# Patient Record
Sex: MALE | Race: WHITE | Employment: FULL TIME | URBAN - NONMETROPOLITAN AREA
[De-identification: names, ages, dates, MRNs, and addresses within clinical notes are randomized per-mention and may not be internally consistent; named-entity substitution may affect disease eponyms.]

---

## 2017-11-15 ENCOUNTER — HOSPITAL ENCOUNTER (EMERGENCY)
Age: 47
Discharge: HOME | End: 2017-11-15

## 2017-11-15 ENCOUNTER — APPOINTMENT (OUTPATIENT)
Dept: CT IMAGING | Age: 47
End: 2017-11-15

## 2017-11-15 VITALS
OXYGEN SATURATION: 96 % | WEIGHT: 170 LBS | TEMPERATURE: 98 F | BODY MASS INDEX: 24.34 KG/M2 | RESPIRATION RATE: 16 BRPM | HEART RATE: 70 BPM | HEIGHT: 70 IN | SYSTOLIC BLOOD PRESSURE: 148 MMHG | DIASTOLIC BLOOD PRESSURE: 96 MMHG

## 2017-11-15 DIAGNOSIS — K40.20 BILATERAL INGUINAL HERNIA WITHOUT OBSTRUCTION OR GANGRENE, RECURRENCE NOT SPECIFIED: ICD-10-CM

## 2017-11-15 DIAGNOSIS — N40.0 ENLARGED PROSTATE: ICD-10-CM

## 2017-11-15 DIAGNOSIS — K52.9 COLITIS: Primary | ICD-10-CM

## 2017-11-15 LAB
ALBUMIN SERPL-MCNC: 4.7 G/DL (ref 3.5–5.2)
ALP BLD-CCNC: 75 U/L (ref 40–130)
ALT SERPL-CCNC: 18 U/L (ref 5–41)
ANION GAP SERPL CALCULATED.3IONS-SCNC: 12 MMOL/L (ref 7–19)
AST SERPL-CCNC: 23 U/L (ref 5–40)
BASOPHILS ABSOLUTE: 0.1 K/UL (ref 0–0.2)
BASOPHILS RELATIVE PERCENT: 0.9 % (ref 0–1)
BILIRUB SERPL-MCNC: 2.2 MG/DL (ref 0.2–1.2)
BILIRUBIN URINE: NEGATIVE
BLOOD, URINE: NEGATIVE
BUN BLDV-MCNC: 7 MG/DL (ref 6–20)
CALCIUM SERPL-MCNC: 9.6 MG/DL (ref 8.6–10)
CHLORIDE BLD-SCNC: 101 MMOL/L (ref 98–111)
CLARITY: CLEAR
CO2: 26 MMOL/L (ref 22–29)
COLOR: YELLOW
CREAT SERPL-MCNC: 0.7 MG/DL (ref 0.5–1.2)
EOSINOPHILS ABSOLUTE: 0.2 K/UL (ref 0–0.6)
EOSINOPHILS RELATIVE PERCENT: 2.6 % (ref 0–5)
GFR NON-AFRICAN AMERICAN: >60
GLUCOSE BLD-MCNC: 104 MG/DL (ref 74–109)
GLUCOSE URINE: NEGATIVE MG/DL
HCT VFR BLD CALC: 55 % (ref 42–52)
HEMOGLOBIN: 19.2 G/DL (ref 14–18)
KETONES, URINE: NEGATIVE MG/DL
LEUKOCYTE ESTERASE, URINE: NEGATIVE
LYMPHOCYTES ABSOLUTE: 2.1 K/UL (ref 1.1–4.5)
LYMPHOCYTES RELATIVE PERCENT: 27.7 % (ref 20–40)
MCH RBC QN AUTO: 32.3 PG (ref 27–31)
MCHC RBC AUTO-ENTMCNC: 34.9 G/DL (ref 33–37)
MCV RBC AUTO: 92.4 FL (ref 80–94)
MONOCYTES ABSOLUTE: 0.6 K/UL (ref 0–0.9)
MONOCYTES RELATIVE PERCENT: 7.9 % (ref 0–10)
NEUTROPHILS ABSOLUTE: 4.6 K/UL (ref 1.5–7.5)
NEUTROPHILS RELATIVE PERCENT: 60.4 % (ref 50–65)
NITRITE, URINE: NEGATIVE
PDW BLD-RTO: 13.4 % (ref 11.5–14.5)
PH UA: 6
PLATELET # BLD: 241 K/UL (ref 130–400)
PMV BLD AUTO: 9.5 FL (ref 9.4–12.4)
POTASSIUM SERPL-SCNC: 3.9 MMOL/L (ref 3.5–5)
PROTEIN UA: NEGATIVE MG/DL
RBC # BLD: 5.95 M/UL (ref 4.7–6.1)
SODIUM BLD-SCNC: 139 MMOL/L (ref 136–145)
SPECIFIC GRAVITY UA: 1
TOTAL PROTEIN: 7.3 G/DL (ref 6.6–8.7)
UROBILINOGEN, URINE: 0.2 E.U./DL
WBC # BLD: 7.7 K/UL (ref 4.8–10.8)

## 2017-11-15 PROCEDURE — 99283 EMERGENCY DEPT VISIT LOW MDM: CPT

## 2017-11-15 PROCEDURE — 36415 COLL VENOUS BLD VENIPUNCTURE: CPT

## 2017-11-15 PROCEDURE — 81003 URINALYSIS AUTO W/O SCOPE: CPT

## 2017-11-15 PROCEDURE — 96374 THER/PROPH/DIAG INJ IV PUSH: CPT

## 2017-11-15 PROCEDURE — 99283 EMERGENCY DEPT VISIT LOW MDM: CPT | Performed by: PHYSICIAN ASSISTANT

## 2017-11-15 PROCEDURE — 96375 TX/PRO/DX INJ NEW DRUG ADDON: CPT

## 2017-11-15 PROCEDURE — 6360000004 HC RX CONTRAST MEDICATION: Performed by: PHYSICIAN ASSISTANT

## 2017-11-15 PROCEDURE — 74177 CT ABD & PELVIS W/CONTRAST: CPT

## 2017-11-15 PROCEDURE — 2580000003 HC RX 258: Performed by: PHYSICIAN ASSISTANT

## 2017-11-15 PROCEDURE — 6360000002 HC RX W HCPCS: Performed by: PHYSICIAN ASSISTANT

## 2017-11-15 PROCEDURE — 80053 COMPREHEN METABOLIC PANEL: CPT

## 2017-11-15 PROCEDURE — 85025 COMPLETE CBC W/AUTO DIFF WBC: CPT

## 2017-11-15 RX ORDER — ONDANSETRON 2 MG/ML
4 INJECTION INTRAMUSCULAR; INTRAVENOUS ONCE
Status: COMPLETED | OUTPATIENT
Start: 2017-11-15 | End: 2017-11-15

## 2017-11-15 RX ORDER — CIPROFLOXACIN 500 MG/1
500 TABLET, FILM COATED ORAL 2 TIMES DAILY
Qty: 20 TABLET | Refills: 0 | Status: SHIPPED | OUTPATIENT
Start: 2017-11-15 | End: 2017-11-25

## 2017-11-15 RX ORDER — IBUPROFEN 200 MG
200 TABLET ORAL EVERY 6 HOURS PRN
COMMUNITY

## 2017-11-15 RX ORDER — METRONIDAZOLE 500 MG/1
500 TABLET ORAL 3 TIMES DAILY
Qty: 30 TABLET | Refills: 0 | Status: SHIPPED | OUTPATIENT
Start: 2017-11-15 | End: 2017-11-25

## 2017-11-15 RX ORDER — ONDANSETRON 4 MG/1
4 TABLET, ORALLY DISINTEGRATING ORAL EVERY 8 HOURS PRN
Qty: 20 TABLET | Refills: 0 | Status: SHIPPED | OUTPATIENT
Start: 2017-11-15 | End: 2020-06-05

## 2017-11-15 RX ORDER — 0.9 % SODIUM CHLORIDE 0.9 %
1000 INTRAVENOUS SOLUTION INTRAVENOUS ONCE
Status: COMPLETED | OUTPATIENT
Start: 2017-11-15 | End: 2017-11-15

## 2017-11-15 RX ORDER — MORPHINE SULFATE 10 MG/ML
2 INJECTION, SOLUTION INTRAMUSCULAR; INTRAVENOUS ONCE
Status: COMPLETED | OUTPATIENT
Start: 2017-11-15 | End: 2017-11-15

## 2017-11-15 RX ADMIN — IOPAMIDOL 90 ML: 755 INJECTION, SOLUTION INTRAVENOUS at 11:23

## 2017-11-15 RX ADMIN — SODIUM CHLORIDE 1000 ML: 9 INJECTION, SOLUTION INTRAVENOUS at 10:36

## 2017-11-15 RX ADMIN — ONDANSETRON 4 MG: 2 INJECTION INTRAMUSCULAR; INTRAVENOUS at 10:36

## 2017-11-15 RX ADMIN — MORPHINE SULFATE 2 MG: 10 INJECTION, SOLUTION INTRAMUSCULAR; INTRAVENOUS at 10:36

## 2017-11-15 ASSESSMENT — PAIN DESCRIPTION - ORIENTATION: ORIENTATION: LEFT

## 2017-11-15 ASSESSMENT — PAIN DESCRIPTION - PAIN TYPE: TYPE: ACUTE PAIN

## 2017-11-15 ASSESSMENT — PAIN DESCRIPTION - LOCATION: LOCATION: GROIN

## 2017-11-15 ASSESSMENT — PAIN SCALES - GENERAL
PAINLEVEL_OUTOF10: 8
PAINLEVEL_OUTOF10: 8

## 2017-11-15 NOTE — ED PROVIDER NOTES
eMERGENCY dEPARTMENT eNCOUnter      Pt Name: Cha Howard  MRN: [id number]  [place] 1970  Date of evaluation: 11/15/2017  Provider: Malathi Curran, [address] [place] [address]       Chief Complaint   Patient presents with    Groin Pain     left         HISTORY OF PRESENT ILLNESS  (Location/Symptom, Timing/Onset, Context/Setting, Quality, Duration, Modifying Factors, Severity.)   Cha Howard is a 56 y.o. male who presents to the emergency department With left groin pain. Patient states he's had this pain off and on for the past 2-3 weeks. He's noticed a bulge near his left groin region. States the bulge is worse when he is lifting things. Denies any fevers at home no nausea vomiting. He has not had a bowel movement in a couple of days however prior to that he had some diarrhea. Denies bloody stool. States the pain got increasingly worse yesterday. Denies any hematuria no difficulty urinating. Pain does not radiate into the testicles. Nursing Notes were reviewed and I agree. REVIEW OF SYSTEMS    (2-9 systems for level 4, 10 or more for level 5)     Review of Systems   Genitourinary:        Groin pain        Except as noted above the remainder of the review of systems was reviewed and negative. PAST MEDICAL HISTORY   History reviewed. No pertinent past medical history. SURGICAL HISTORY     History reviewed. No pertinent surgical history. CURRENT MEDICATIONS       Discharge Medication List as of 11/15/2017 12:11 PM      CONTINUE these medications which have NOT CHANGED    Details   ibuprofen (ADVIL;MOTRIN) 200 MG tablet Take 200 mg by mouth every 6 hours as needed for PainHistorical Med             ALLERGIES     Review of patient's allergies indicates no known allergies. FAMILY HISTORY     History reviewed. No pertinent family history.        SOCIAL HISTORY       Social History     Social History    Marital status:      Spouse name: N/A    Number of children: N/A  Years of education: N/A     Social History Main Topics    Smoking status: Current Every Day Smoker     Packs/day: 1.00    Smokeless tobacco: Never Used    Alcohol use 25.2 oz/week     42 Cans of beer per week    Drug use: No    Sexual activity: Not Asked     Other Topics Concern    None     Social History Narrative    None       SCREENINGS           PHYSICAL EXAM    (up to 7 for level 4, 8 or more for level 5)     ED Triage Vitals   BP Temp Temp src Pulse Resp SpO2 Height Weight   11/15/17 1013 11/15/17 1013 -- 11/15/17 1012 11/15/17 1012 11/15/17 1012 11/15/17 1012 11/15/17 1012   (!) 155/103 98 °F (36.7 °C)  73 18 94 % 5' 10\" (1.778 m) 170 lb (77.1 kg)       Physical Exam   Constitutional: He is oriented to person, place, and time. He appears well-developed and well-nourished. No distress. HENT:   Head: Normocephalic and atraumatic. Neck: Normal range of motion. Cardiovascular: Normal rate, regular rhythm and normal heart sounds. Exam reveals no gallop and no friction rub. No murmur heard. Pulmonary/Chest: Effort normal and breath sounds normal. No respiratory distress. He has no wheezes. He has no rales. He exhibits no tenderness. Abdominal: Soft. Bowel sounds are normal. He exhibits no distension. There is tenderness. There is no rebound and no guarding. A hernia is present. Hernia confirmed positive in the left inguinal area. Hernia confirmed negative in the right inguinal area. Genitourinary: Testes normal.   Lymphadenopathy:     He has no cervical adenopathy. Right: No inguinal adenopathy present. Left: No inguinal adenopathy present. Neurological: He is alert and oriented to person, place, and time. Skin: Skin is warm and dry. He is not diaphoretic. Psychiatric: He has a normal mood and affect. Nursing note and vitals reviewed.         DIAGNOSTIC RESULTS     RADIOLOGY:   Non-plain film images such as CT, Ultrasound and MRI are read by the radiologist.   Interpretation per the Radiologist below, if available at the time of this note:    CT ABDOMEN PELVIS W IV CONTRAST Additional Contrast? None   Final Result   Inflammatory changes of the left colon/acute colitis. Markedly distended urinary bladder which reaches up to the umbilicus. The etiology is not certain. Moderately enlarged prostate with intrinsic calcification. A mild right hydronephrosis. No radiopaque calculi. This may be   secondary to the distended urinary bladder? .   Small fat-containing inguinal hernias bilaterally. The above findings are recorded on a digital voice clip in PACS. Signed by Dr Samantha Hoover on 11/15/2017 11:48 AM          LABS:  Labs Reviewed   CBC WITH AUTO DIFFERENTIAL - Abnormal; Notable for the following:        Result Value    Hemoglobin 19.2 (*)     Hematocrit 55.0 (*)     MCH 32.3 (*)     All other components within normal limits   COMPREHENSIVE METABOLIC PANEL - Abnormal; Notable for the following: Total Bilirubin 2.2 (*)     All other components within normal limits   URINALYSIS       All other labs were within normal range or not returned as of this dictation. EMERGENCY DEPARTMENT COURSE and DIFFERENTIAL DIAGNOSIS/MDM:   Vitals:    Vitals:    11/15/17 1012 11/15/17 1013 11/15/17 1235   BP:  (!) 155/103 (!) 148/96   Pulse: 73  70   Resp: 18  16   Temp:  98 °F (36.7 °C) 98 °F (36.7 °C)   TempSrc:   Oral   SpO2: 94%  96%   Weight: 170 lb (77.1 kg)     Height: 5' 10\" (1.778 m)             MDM  Number of Diagnoses or Management Options  Bilateral inguinal hernia without obstruction or gangrene, recurrence not specified:   Colitis:   Enlarged prostate:   Diagnosis management comments: Discussed CT findings with the patient. Showed left acute colitis. Also noted moderately enlarged prostate with calcification. Patient had an enlarged bladder noted on CT the abdomen however he urinated over 800 mL of urine after his scan without difficulty.   We'll (Please note that portions of this note were completed with a voice recognition program.  Efforts were made to edit the dictations but occasionally words are mis-transcribed.)    SRIRAM Waldron [place]  11/15/17 0979

## 2020-05-22 ENCOUNTER — PREP FOR PROCEDURE (OUTPATIENT)
Dept: SURGERY | Age: 50
End: 2020-05-22

## 2020-05-22 ENCOUNTER — OFFICE VISIT (OUTPATIENT)
Dept: SURGERY | Age: 50
End: 2020-05-22
Payer: MEDICAID

## 2020-05-22 VITALS
DIASTOLIC BLOOD PRESSURE: 103 MMHG | HEIGHT: 69 IN | SYSTOLIC BLOOD PRESSURE: 147 MMHG | HEART RATE: 100 BPM | BODY MASS INDEX: 25.62 KG/M2 | TEMPERATURE: 97.2 F | WEIGHT: 173 LBS

## 2020-05-22 PROBLEM — K40.20 BILATERAL SCROTAL HERNIA: Status: ACTIVE | Noted: 2020-05-22

## 2020-05-22 PROCEDURE — 99203 OFFICE O/P NEW LOW 30 MIN: CPT | Performed by: PHYSICIAN ASSISTANT

## 2020-05-22 RX ORDER — SODIUM CHLORIDE 0.9 % (FLUSH) 0.9 %
10 SYRINGE (ML) INJECTION PRN
Status: CANCELLED | OUTPATIENT
Start: 2020-05-22

## 2020-05-22 RX ORDER — SODIUM CHLORIDE 0.9 % (FLUSH) 0.9 %
10 SYRINGE (ML) INJECTION EVERY 12 HOURS SCHEDULED
Status: CANCELLED | OUTPATIENT
Start: 2020-05-22

## 2020-05-22 RX ORDER — SODIUM CHLORIDE, SODIUM LACTATE, POTASSIUM CHLORIDE, CALCIUM CHLORIDE 600; 310; 30; 20 MG/100ML; MG/100ML; MG/100ML; MG/100ML
INJECTION, SOLUTION INTRAVENOUS CONTINUOUS
Status: CANCELLED | OUTPATIENT
Start: 2020-05-22

## 2020-05-22 ASSESSMENT — ENCOUNTER SYMPTOMS
DIARRHEA: 1
ABDOMINAL DISTENTION: 0
WHEEZING: 0
BACK PAIN: 1
APNEA: 0
NAUSEA: 0
SINUS PRESSURE: 0
ABDOMINAL PAIN: 1
BLOOD IN STOOL: 1
VOMITING: 0
SHORTNESS OF BREATH: 0
ALLERGIC/IMMUNOLOGIC NEGATIVE: 1
CONSTIPATION: 0
EYE DISCHARGE: 1

## 2020-05-22 NOTE — H&P
risks, benefits, and options were discussed with the pt. He is willing to accept all risks and proceed with a Lap. Bilateral Inguinal hernia repair with mesh with an open repair. The surgical risks included but not limited to bleeding, infection, perforation, risk of nerve injury, risk of recurrence, risk of chronic pain, etc. The anesthetic risks included heart attacks, strokes, pneumonia, phlebitis, etc.  He is willing to accept all risks and proceed with surgery. No guarantees have been given.       Electronically signed by Charels Moreno PA-C on 5/22/20 at 4:13 PM CDT

## 2020-05-22 NOTE — H&P (VIEW-ONLY)
of Systems   Constitutional: Positive for fatigue. Negative for appetite change, chills, fever and unexpected weight change. HENT: Negative for dental problem, hearing loss, sinus pressure and tinnitus. Eyes: Positive for discharge. Respiratory: Negative for apnea, shortness of breath and wheezing. Cardiovascular: Positive for palpitations. Negative for chest pain and leg swelling. Gastrointestinal: Positive for abdominal pain, blood in stool and diarrhea. Negative for abdominal distention, constipation, nausea and vomiting. Endocrine: Negative for polydipsia, polyphagia and polyuria. Genitourinary: Negative for difficulty urinating, dysuria, flank pain and hematuria. Musculoskeletal: Positive for back pain. Negative for arthralgias, joint swelling and myalgias. Skin: Negative. Allergic/Immunologic: Negative. Neurological: Positive for headaches. Negative for dizziness, seizures and syncope. Hematological: Negative for adenopathy. Does not bruise/bleed easily. Psychiatric/Behavioral: Positive for sleep disturbance. Negative for confusion. The patient is nervous/anxious. OBJECTIVE:  BP (!) 147/103 (Site: Right Upper Arm, Position: Sitting, Cuff Size: Medium Adult)   Pulse 100   Temp 97.2 °F (36.2 °C) (Temporal)   Ht 5' 9\" (1.753 m)   Wt 173 lb (78.5 kg)   BMI 25.55 kg/m²   Physical Exam  Vitals signs reviewed. Constitutional:       Appearance: Normal appearance. He is not diaphoretic. HENT:      Head: Normocephalic and atraumatic. Nose: Nose normal. No congestion or rhinorrhea. Mouth/Throat:      Mouth: Mucous membranes are moist.      Pharynx: Oropharynx is clear. No oropharyngeal exudate or posterior oropharyngeal erythema. Eyes:      General: No scleral icterus. Extraocular Movements: Extraocular movements intact. Conjunctiva/sclera: Conjunctivae normal.      Pupils: Pupils are equal, round, and reactive to light.    Neck:      Musculoskeletal: risks, benefits, and options were discussed with the pt. He is willing to accept all risks and proceed with a Lap. Bilateral Inguinal hernia repair with mesh with an open repair. The surgical risks included but not limited to bleeding, infection, perforation, risk of nerve injury, risk of recurrence, risk of chronic pain, etc. The anesthetic risks included heart attacks, strokes, pneumonia, phlebitis, etc.  He is willing to accept all risks and proceed with surgery. No guarantees have been given.       Electronically signed by Carlos Scruggs PA-C on 5/22/20 at 4:13 PM CDT

## 2020-05-22 NOTE — LETTER
SCHEDULING INSTRUCTIONS:     Patient: Doug Soto  : 1970    Hospital: [facility]    Admitting Physician:  Dr. Goyo Cueva    Diagnosis: Bilateral scrotal hernias    Procedure: Lap. Bilateral Inguinal hernia repair with mesh - possible open    Time: 1 1/2 hrs    ALLOW ADDITIONAL 30 MINS FOR TURNOVER EXCEPT FOR PHYSICIAN'S BOUNCE DAY    Anesthesia: GEN    Admission:Outpatient     Date: for NA    Preop medications for Dr. Sung Howe patients:  Celebrex 200 mg, Tylenol 975 mg, Gabapentin 300 mg take with a sip of water in holding.              NOT TO BE USED OUTSIDE OF THE OFFICE

## 2020-06-05 ENCOUNTER — HOSPITAL ENCOUNTER (OUTPATIENT)
Dept: PREADMISSION TESTING | Age: 50
Discharge: HOME | End: 2020-06-09
Payer: MEDICAID

## 2020-06-05 VITALS — BODY MASS INDEX: 25.62 KG/M2 | WEIGHT: 173 LBS | HEIGHT: 69 IN

## 2020-06-05 PROCEDURE — 93005 ELECTROCARDIOGRAM TRACING: CPT | Performed by: ANESTHESIOLOGY

## 2020-06-05 PROCEDURE — 87081 CULTURE SCREEN ONLY: CPT

## 2020-06-06 ENCOUNTER — OFFICE VISIT (OUTPATIENT)
Age: 50
End: 2020-06-06

## 2020-06-06 VITALS — OXYGEN SATURATION: 96 % | TEMPERATURE: 97.1 F

## 2020-06-06 LAB
EKG P AXIS: 63 DEGREES
EKG P-R INTERVAL: 142 MS
EKG Q-T INTERVAL: 394 MS
EKG QRS DURATION: 102 MS
EKG QTC CALCULATION (BAZETT): 397 MS
EKG T AXIS: 45 DEGREES
MRSA CULTURE ONLY: NORMAL

## 2020-06-06 PROCEDURE — 93010 ELECTROCARDIOGRAM REPORT: CPT | Performed by: INTERNAL MEDICINE

## 2020-06-06 NOTE — PROGRESS NOTES
Patient was not seen//assessed by provider in the flu clinic today. COVID swab was order in compliance with requirement for testing prior to surgery or invasive procedure. Nasopharyngeal swab was collected and ordered through [facility] vendor.

## 2020-06-09 ENCOUNTER — ANESTHESIA EVENT (OUTPATIENT)
Dept: OPERATING ROOM | Age: 50
End: 2020-06-09
Payer: MEDICAID

## 2020-06-09 LAB
REPORT: NORMAL
SARS-COV-2: NOT DETECTED
THIS TEST SENT TO: NORMAL

## 2020-06-10 ENCOUNTER — ANESTHESIA (OUTPATIENT)
Dept: OPERATING ROOM | Age: 50
End: 2020-06-10
Payer: MEDICAID

## 2020-06-10 ENCOUNTER — HOSPITAL ENCOUNTER (OUTPATIENT)
Age: 50
Setting detail: OUTPATIENT SURGERY
Discharge: HOME | End: 2020-06-10
Attending: SURGERY | Admitting: SURGERY
Payer: MEDICAID

## 2020-06-10 VITALS
SYSTOLIC BLOOD PRESSURE: 146 MMHG | RESPIRATION RATE: 3 BRPM | OXYGEN SATURATION: 98 % | DIASTOLIC BLOOD PRESSURE: 82 MMHG | TEMPERATURE: 96.4 F

## 2020-06-10 VITALS
OXYGEN SATURATION: 96 % | RESPIRATION RATE: 18 BRPM | BODY MASS INDEX: 25.62 KG/M2 | TEMPERATURE: 97.8 F | DIASTOLIC BLOOD PRESSURE: 93 MMHG | WEIGHT: 173 LBS | HEART RATE: 59 BPM | SYSTOLIC BLOOD PRESSURE: 139 MMHG | HEIGHT: 69 IN

## 2020-06-10 PROBLEM — K40.20 NON-RECURRENT BILATERAL INGUINAL HERNIA WITHOUT OBSTRUCTION OR GANGRENE: Status: ACTIVE | Noted: 2020-06-10

## 2020-06-10 PROCEDURE — 2500000003 HC RX 250 WO HCPCS: Performed by: SURGERY

## 2020-06-10 PROCEDURE — 3600000004 HC SURGERY LEVEL 4 BASE: Performed by: SURGERY

## 2020-06-10 PROCEDURE — C1781 MESH (IMPLANTABLE): HCPCS | Performed by: SURGERY

## 2020-06-10 PROCEDURE — 6360000002 HC RX W HCPCS: Performed by: PHYSICIAN ASSISTANT

## 2020-06-10 PROCEDURE — 3600000014 HC SURGERY LEVEL 4 ADDTL 15MIN: Performed by: SURGERY

## 2020-06-10 PROCEDURE — 2580000003 HC RX 258: Performed by: PHYSICIAN ASSISTANT

## 2020-06-10 PROCEDURE — 6360000002 HC RX W HCPCS: Performed by: NURSE ANESTHETIST, CERTIFIED REGISTERED

## 2020-06-10 PROCEDURE — 7100000001 HC PACU RECOVERY - ADDTL 15 MIN: Performed by: SURGERY

## 2020-06-10 PROCEDURE — 3700000001 HC ADD 15 MINUTES (ANESTHESIA): Performed by: SURGERY

## 2020-06-10 PROCEDURE — 2500000003 HC RX 250 WO HCPCS: Performed by: NURSE ANESTHETIST, CERTIFIED REGISTERED

## 2020-06-10 PROCEDURE — C1727 CATH, BAL TIS DIS, NON-VAS: HCPCS | Performed by: SURGERY

## 2020-06-10 PROCEDURE — 2709999900 HC NON-CHARGEABLE SUPPLY: Performed by: SURGERY

## 2020-06-10 PROCEDURE — 7100000010 HC PHASE II RECOVERY - FIRST 15 MIN: Performed by: SURGERY

## 2020-06-10 PROCEDURE — 2720000010 HC SURG SUPPLY STERILE: Performed by: SURGERY

## 2020-06-10 PROCEDURE — 3700000000 HC ANESTHESIA ATTENDED CARE: Performed by: SURGERY

## 2020-06-10 PROCEDURE — 7100000011 HC PHASE II RECOVERY - ADDTL 15 MIN: Performed by: SURGERY

## 2020-06-10 PROCEDURE — 49650 LAP ING HERNIA REPAIR INIT: CPT | Performed by: SURGERY

## 2020-06-10 PROCEDURE — 7100000000 HC PACU RECOVERY - FIRST 15 MIN: Performed by: SURGERY

## 2020-06-10 DEVICE — MESH HERN 15X15CM MACROPOROUS ULTRAPRO ADV: Type: IMPLANTABLE DEVICE | Status: FUNCTIONAL

## 2020-06-10 RX ORDER — LABETALOL HYDROCHLORIDE 5 MG/ML
5 INJECTION, SOLUTION INTRAVENOUS EVERY 10 MIN PRN
Status: DISCONTINUED | OUTPATIENT
Start: 2020-06-10 | End: 2020-06-10 | Stop reason: HOSPADM

## 2020-06-10 RX ORDER — BUPIVACAINE HYDROCHLORIDE 2.5 MG/ML
INJECTION, SOLUTION INFILTRATION; PERINEURAL PRN
Status: DISCONTINUED | OUTPATIENT
Start: 2020-06-10 | End: 2020-06-10 | Stop reason: ALTCHOICE

## 2020-06-10 RX ORDER — SODIUM CHLORIDE, SODIUM LACTATE, POTASSIUM CHLORIDE, CALCIUM CHLORIDE 600; 310; 30; 20 MG/100ML; MG/100ML; MG/100ML; MG/100ML
INJECTION, SOLUTION INTRAVENOUS CONTINUOUS
Status: DISCONTINUED | OUTPATIENT
Start: 2020-06-10 | End: 2020-06-10 | Stop reason: HOSPADM

## 2020-06-10 RX ORDER — METOCLOPRAMIDE HYDROCHLORIDE 5 MG/ML
10 INJECTION INTRAMUSCULAR; INTRAVENOUS
Status: DISCONTINUED | OUTPATIENT
Start: 2020-06-10 | End: 2020-06-10 | Stop reason: HOSPADM

## 2020-06-10 RX ORDER — MORPHINE SULFATE 4 MG/ML
4 INJECTION, SOLUTION INTRAMUSCULAR; INTRAVENOUS EVERY 5 MIN PRN
Status: DISCONTINUED | OUTPATIENT
Start: 2020-06-10 | End: 2020-06-10 | Stop reason: HOSPADM

## 2020-06-10 RX ORDER — HYDROMORPHONE HYDROCHLORIDE 1 MG/ML
0.25 INJECTION, SOLUTION INTRAMUSCULAR; INTRAVENOUS; SUBCUTANEOUS EVERY 5 MIN PRN
Status: DISCONTINUED | OUTPATIENT
Start: 2020-06-10 | End: 2020-06-10 | Stop reason: HOSPADM

## 2020-06-10 RX ORDER — MIDAZOLAM HYDROCHLORIDE 1 MG/ML
2 INJECTION INTRAMUSCULAR; INTRAVENOUS
Status: DISCONTINUED | OUTPATIENT
Start: 2020-06-10 | End: 2020-06-10 | Stop reason: HOSPADM

## 2020-06-10 RX ORDER — SODIUM CHLORIDE 0.9 % (FLUSH) 0.9 %
10 SYRINGE (ML) INJECTION PRN
Status: DISCONTINUED | OUTPATIENT
Start: 2020-06-10 | End: 2020-06-10 | Stop reason: HOSPADM

## 2020-06-10 RX ORDER — HYDROCODONE BITARTRATE AND ACETAMINOPHEN 5; 325 MG/1; MG/1
2 TABLET ORAL PRN
Status: DISCONTINUED | OUTPATIENT
Start: 2020-06-10 | End: 2020-06-10 | Stop reason: HOSPADM

## 2020-06-10 RX ORDER — SODIUM CHLORIDE 9 MG/ML
INJECTION, SOLUTION INTRAVENOUS CONTINUOUS
Status: DISCONTINUED | OUTPATIENT
Start: 2020-06-10 | End: 2020-06-10 | Stop reason: HOSPADM

## 2020-06-10 RX ORDER — OXYCODONE HYDROCHLORIDE AND ACETAMINOPHEN 5; 325 MG/1; MG/1
1 TABLET ORAL EVERY 4 HOURS PRN
Qty: 25 TABLET | Refills: 0 | Status: SHIPPED | OUTPATIENT
Start: 2020-06-10 | End: 2020-06-17

## 2020-06-10 RX ORDER — GLYCOPYRROLATE 0.2 MG/ML
INJECTION INTRAMUSCULAR; INTRAVENOUS PRN
Status: DISCONTINUED | OUTPATIENT
Start: 2020-06-10 | End: 2020-06-10 | Stop reason: SDUPTHER

## 2020-06-10 RX ORDER — HYDROMORPHONE HYDROCHLORIDE 1 MG/ML
0.5 INJECTION, SOLUTION INTRAMUSCULAR; INTRAVENOUS; SUBCUTANEOUS EVERY 5 MIN PRN
Status: DISCONTINUED | OUTPATIENT
Start: 2020-06-10 | End: 2020-06-10 | Stop reason: HOSPADM

## 2020-06-10 RX ORDER — LIDOCAINE HYDROCHLORIDE 10 MG/ML
INJECTION, SOLUTION EPIDURAL; INFILTRATION; INTRACAUDAL; PERINEURAL PRN
Status: DISCONTINUED | OUTPATIENT
Start: 2020-06-10 | End: 2020-06-10 | Stop reason: SDUPTHER

## 2020-06-10 RX ORDER — DEXAMETHASONE SODIUM PHOSPHATE 10 MG/ML
INJECTION, SOLUTION INTRAMUSCULAR; INTRAVENOUS PRN
Status: DISCONTINUED | OUTPATIENT
Start: 2020-06-10 | End: 2020-06-10 | Stop reason: SDUPTHER

## 2020-06-10 RX ORDER — PROPOFOL 10 MG/ML
INJECTION, EMULSION INTRAVENOUS PRN
Status: DISCONTINUED | OUTPATIENT
Start: 2020-06-10 | End: 2020-06-10 | Stop reason: SDUPTHER

## 2020-06-10 RX ORDER — HYDROCODONE BITARTRATE AND ACETAMINOPHEN 5; 325 MG/1; MG/1
1 TABLET ORAL PRN
Status: DISCONTINUED | OUTPATIENT
Start: 2020-06-10 | End: 2020-06-10 | Stop reason: HOSPADM

## 2020-06-10 RX ORDER — FENTANYL CITRATE 50 UG/ML
25 INJECTION, SOLUTION INTRAMUSCULAR; INTRAVENOUS
Status: DISCONTINUED | OUTPATIENT
Start: 2020-06-10 | End: 2020-06-10 | Stop reason: HOSPADM

## 2020-06-10 RX ORDER — LIDOCAINE HYDROCHLORIDE 10 MG/ML
1 INJECTION, SOLUTION EPIDURAL; INFILTRATION; INTRACAUDAL; PERINEURAL
Status: DISCONTINUED | OUTPATIENT
Start: 2020-06-10 | End: 2020-06-10 | Stop reason: HOSPADM

## 2020-06-10 RX ORDER — DIPHENHYDRAMINE HYDROCHLORIDE 50 MG/ML
12.5 INJECTION INTRAMUSCULAR; INTRAVENOUS
Status: DISCONTINUED | OUTPATIENT
Start: 2020-06-10 | End: 2020-06-10 | Stop reason: HOSPADM

## 2020-06-10 RX ORDER — MORPHINE SULFATE 4 MG/ML
2 INJECTION, SOLUTION INTRAMUSCULAR; INTRAVENOUS EVERY 5 MIN PRN
Status: DISCONTINUED | OUTPATIENT
Start: 2020-06-10 | End: 2020-06-10 | Stop reason: HOSPADM

## 2020-06-10 RX ORDER — PROMETHAZINE HYDROCHLORIDE 25 MG/ML
6.25 INJECTION, SOLUTION INTRAMUSCULAR; INTRAVENOUS
Status: DISCONTINUED | OUTPATIENT
Start: 2020-06-10 | End: 2020-06-10 | Stop reason: HOSPADM

## 2020-06-10 RX ORDER — SODIUM CHLORIDE 0.9 % (FLUSH) 0.9 %
10 SYRINGE (ML) INJECTION EVERY 12 HOURS SCHEDULED
Status: DISCONTINUED | OUTPATIENT
Start: 2020-06-10 | End: 2020-06-10 | Stop reason: HOSPADM

## 2020-06-10 RX ORDER — KETOROLAC TROMETHAMINE 30 MG/ML
INJECTION, SOLUTION INTRAMUSCULAR; INTRAVENOUS PRN
Status: DISCONTINUED | OUTPATIENT
Start: 2020-06-10 | End: 2020-06-10 | Stop reason: SDUPTHER

## 2020-06-10 RX ORDER — MEPERIDINE HYDROCHLORIDE 50 MG/ML
12.5 INJECTION INTRAMUSCULAR; INTRAVENOUS; SUBCUTANEOUS EVERY 5 MIN PRN
Status: DISCONTINUED | OUTPATIENT
Start: 2020-06-10 | End: 2020-06-10 | Stop reason: HOSPADM

## 2020-06-10 RX ORDER — HYDRALAZINE HYDROCHLORIDE 20 MG/ML
5 INJECTION INTRAMUSCULAR; INTRAVENOUS EVERY 10 MIN PRN
Status: DISCONTINUED | OUTPATIENT
Start: 2020-06-10 | End: 2020-06-10 | Stop reason: HOSPADM

## 2020-06-10 RX ORDER — ENALAPRILAT 2.5 MG/2ML
1.25 INJECTION INTRAVENOUS
Status: DISCONTINUED | OUTPATIENT
Start: 2020-06-10 | End: 2020-06-10 | Stop reason: HOSPADM

## 2020-06-10 RX ORDER — FENTANYL CITRATE 50 UG/ML
50 INJECTION, SOLUTION INTRAMUSCULAR; INTRAVENOUS
Status: DISCONTINUED | OUTPATIENT
Start: 2020-06-10 | End: 2020-06-10 | Stop reason: HOSPADM

## 2020-06-10 RX ORDER — FENTANYL CITRATE 50 UG/ML
INJECTION, SOLUTION INTRAMUSCULAR; INTRAVENOUS PRN
Status: DISCONTINUED | OUTPATIENT
Start: 2020-06-10 | End: 2020-06-10 | Stop reason: SDUPTHER

## 2020-06-10 RX ORDER — ROCURONIUM BROMIDE 10 MG/ML
INJECTION, SOLUTION INTRAVENOUS PRN
Status: DISCONTINUED | OUTPATIENT
Start: 2020-06-10 | End: 2020-06-10 | Stop reason: SDUPTHER

## 2020-06-10 RX ORDER — ONDANSETRON 2 MG/ML
INJECTION INTRAMUSCULAR; INTRAVENOUS PRN
Status: DISCONTINUED | OUTPATIENT
Start: 2020-06-10 | End: 2020-06-10 | Stop reason: SDUPTHER

## 2020-06-10 RX ADMIN — LIDOCAINE HYDROCHLORIDE 50 MG: 10 INJECTION, SOLUTION EPIDURAL; INFILTRATION; INTRACAUDAL; PERINEURAL at 13:01

## 2020-06-10 RX ADMIN — SODIUM CHLORIDE, SODIUM LACTATE, POTASSIUM CHLORIDE, AND CALCIUM CHLORIDE: 600; 310; 30; 20 INJECTION, SOLUTION INTRAVENOUS at 11:39

## 2020-06-10 RX ADMIN — NEOSTIGMINE METHYLSULFATE 1 MG: 1 INJECTION, SOLUTION INTRAMUSCULAR; INTRAVENOUS; SUBCUTANEOUS at 14:44

## 2020-06-10 RX ADMIN — HYDROMORPHONE HYDROCHLORIDE 0.5 MG: 1 INJECTION, SOLUTION INTRAMUSCULAR; INTRAVENOUS; SUBCUTANEOUS at 15:00

## 2020-06-10 RX ADMIN — KETOROLAC TROMETHAMINE 30 MG: 30 INJECTION, SOLUTION INTRAMUSCULAR; INTRAVENOUS at 13:18

## 2020-06-10 RX ADMIN — DEXAMETHASONE SODIUM PHOSPHATE 10 MG: 10 INJECTION, SOLUTION INTRAMUSCULAR; INTRAVENOUS at 13:11

## 2020-06-10 RX ADMIN — HYDROMORPHONE HYDROCHLORIDE 0.5 MG: 1 INJECTION, SOLUTION INTRAMUSCULAR; INTRAVENOUS; SUBCUTANEOUS at 15:39

## 2020-06-10 RX ADMIN — PROPOFOL 200 MG: 10 INJECTION, EMULSION INTRAVENOUS at 13:01

## 2020-06-10 RX ADMIN — FENTANYL CITRATE 50 MCG: 50 INJECTION INTRAMUSCULAR; INTRAVENOUS at 13:33

## 2020-06-10 RX ADMIN — HYDROMORPHONE HYDROCHLORIDE 0.5 MG: 1 INJECTION, SOLUTION INTRAMUSCULAR; INTRAVENOUS; SUBCUTANEOUS at 14:38

## 2020-06-10 RX ADMIN — ROCURONIUM BROMIDE 75 MG: 10 INJECTION, SOLUTION INTRAVENOUS at 13:01

## 2020-06-10 RX ADMIN — ONDANSETRON HYDROCHLORIDE 4 MG: 2 INJECTION, SOLUTION INTRAMUSCULAR; INTRAVENOUS at 14:38

## 2020-06-10 RX ADMIN — FENTANYL CITRATE 100 MCG: 50 INJECTION INTRAMUSCULAR; INTRAVENOUS at 13:43

## 2020-06-10 RX ADMIN — GLYCOPYRROLATE 0.2 MG: 0.2 INJECTION, SOLUTION INTRAMUSCULAR; INTRAVENOUS at 14:44

## 2020-06-10 RX ADMIN — Medication 2 G: at 13:03

## 2020-06-10 RX ADMIN — SODIUM CHLORIDE, SODIUM LACTATE, POTASSIUM CHLORIDE, AND CALCIUM CHLORIDE: 600; 310; 30; 20 INJECTION, SOLUTION INTRAVENOUS at 14:27

## 2020-06-10 RX ADMIN — FENTANYL CITRATE 100 MCG: 50 INJECTION INTRAMUSCULAR; INTRAVENOUS at 13:01

## 2020-06-10 RX ADMIN — HYDROMORPHONE HYDROCHLORIDE 1 MG: 1 INJECTION, SOLUTION INTRAMUSCULAR; INTRAVENOUS; SUBCUTANEOUS at 15:09

## 2020-06-10 RX ADMIN — LIDOCAINE HYDROCHLORIDE 50 MG: 10 INJECTION, SOLUTION EPIDURAL; INFILTRATION; INTRACAUDAL; PERINEURAL at 15:00

## 2020-06-10 ASSESSMENT — PAIN SCALES - GENERAL
PAINLEVEL_OUTOF10: 4
PAINLEVEL_OUTOF10: 7
PAINLEVEL_OUTOF10: 4
PAINLEVEL_OUTOF10: 7
PAINLEVEL_OUTOF10: 5
PAINLEVEL_OUTOF10: 4

## 2020-06-10 ASSESSMENT — PAIN DESCRIPTION - LOCATION
LOCATION: ABDOMEN
LOCATION: ABDOMEN

## 2020-06-10 ASSESSMENT — LIFESTYLE VARIABLES: SMOKING_STATUS: 1

## 2020-06-10 ASSESSMENT — PAIN DESCRIPTION - PAIN TYPE: TYPE: SURGICAL PAIN

## 2020-06-10 ASSESSMENT — ENCOUNTER SYMPTOMS: SHORTNESS OF BREATH: 0

## 2020-06-10 NOTE — ANESTHESIA PRE PROCEDURE
Department of Anesthesiology  Preprocedure Note       Name:  Francisca Yan   Age:  53 y.o.  :  1970                                          MRN:  [id number]         Date:  6/10/2020      Surgeon: Panda Prasad):  Evelina Hastings MD    Procedure: Procedure(s):  LAPAROSCOPIC BILATERAL INGUINAL HERNIA REPAIR WITH MESH- POSSIBLE OPEN    Medications prior to admission:   Prior to Admission medications    Medication Sig Start Date End Date Taking? Authorizing Provider   ibuprofen (ADVIL;MOTRIN) 200 MG tablet Take 200 mg by mouth every 6 hours as needed for Pain   Yes Historical Provider, MD       Current medications:    Current Facility-Administered Medications   Medication Dose Route Frequency Provider Last Rate Last Dose    ceFAZolin (ANCEF) 2 g in 0.9% sodium chloride 50 mL IVPB  2 g Intravenous On Call to [address] [place] [address]        lactated ringers infusion   Intravenous Continuous Jaleel Ibarra PA-C 125 mL/hr at 06/10/20 1139      sodium chloride flush 0.9 % injection 10 mL  10 mL Intravenous 2 times per day Jaleel Ibarra PA-C        sodium chloride flush 0.9 % injection 10 mL  10 mL Intravenous PRN Jaleel Ibarra PA-C           Allergies:  No Known Allergies    Problem List:    Patient Active Problem List   Diagnosis Code    Bilateral scrotal hernia K40.20       Past Medical History:        Diagnosis Date    Inguinal hernia bilateral, non-recurrent        Past Surgical History:        Procedure Laterality Date    DENTAL SURGERY      all upper teeth removed       Social History:    Social History     Tobacco Use    Smoking status: Current Every Day Smoker     Packs/day: 1.00     Years: 30.00     Pack years: 30.00     Types: Cigarettes    Smokeless tobacco: Never Used    Tobacco comment: Work at [employer]    Alcohol use:  Yes     Alcohol/week: 6.0 standard drinks     Types: 6 Cans of beer per week     Comment: 6 pack per night Ready to quit: Not Answered  Counseling given: Not Answered  Comment: Work at [employer] Signs (Current):   Vitals:    06/10/20 1128   BP: (!) 152/93   Pulse: 70   Resp: 18   Temp: 97.5 °F (36.4 °C)   TempSrc: Temporal   SpO2: 98%   Weight: 173 lb (78.5 kg)   Height: 5' 9\" (1.753 m)                                              BP Readings from Last 3 Encounters:   06/10/20 (!) 152/93   05/22/20 (!) 147/103   11/15/17 (!) 148/96       NPO Status: Time of last liquid consumption: 0000                        Time of last solid consumption: 0000                        Date of last liquid consumption: 06/09/20                        Date of last solid food consumption: 06/09/20    BMI:   Wt Readings from Last 3 Encounters:   06/10/20 173 lb (78.5 kg)   06/05/20 173 lb (78.5 kg)   05/22/20 173 lb (78.5 kg)     Body mass index is 25.55 kg/m². CBC:   Lab Results   Component Value Date    WBC 7.7 11/15/2017    RBC 5.95 11/15/2017    HGB 19.2 11/15/2017    HCT 55.0 11/15/2017    MCV 92.4 11/15/2017    RDW 13.4 11/15/2017     11/15/2017       CMP:   Lab Results   Component Value Date     11/15/2017    K 3.9 11/15/2017     11/15/2017    CO2 26 11/15/2017    BUN 7 11/15/2017    CREATININE 0.7 11/15/2017    LABGLOM >60 11/15/2017    GLUCOSE 104 11/15/2017    PROT 7.3 11/15/2017    CALCIUM 9.6 11/15/2017    BILITOT 2.2 11/15/2017    ALKPHOS 75 11/15/2017    AST 23 11/15/2017    ALT 18 11/15/2017       POC Tests: No results for input(s): POCGLU, POCNA, POCK, POCCL, POCBUN, POCHEMO, POCHCT in the last 72 hours.     Coags: No results found for: PROTIME, INR, APTT    HCG (If Applicable): No results found for: PREGTESTUR, PREGSERUM, HCG, HCGQUANT     ABGs: No results found for: PHART, PO2ART, VJJ6ASZ, IBT6FJA, BEART, B6BGLLVR     Type & Screen (If Applicable):  No results found for: LABABO, LABRH    Drug/Infectious Status (If Applicable):  No results found for: HIV, HEPCAB    COVID-19 Screening (If

## 2020-06-10 NOTE — ANESTHESIA POSTPROCEDURE EVALUATION
Department of Anesthesiology  Postprocedure Note    Patient: Rich Mary  MRN: [id number]  YOB: 1970  Date of evaluation: 6/10/2020  Time:  3:07 PM     Procedure Summary     Date:  06/10/20 Room / Location:  [address]    Anesthesia Start:  0115 Anesthesia Stop:  5214    Procedure:  [place] (Bilateral ) Diagnosis:  (BILATERAL SCROTAL HERNIAS)    Surgeon:  Neida Rao MD Responsible Provider:  HAN Coffey CRNA    Anesthesia Type:  general ASA Status:  1          Anesthesia Type: general    Sweta Phase I:      Sweta Phase II:      Last vitals: Reviewed and per EMR flowsheets.        Anesthesia Post Evaluation    Patient location during evaluation: PACU  Patient participation: complete - patient participated  Level of consciousness: responsive to verbal stimuli and sleepy but conscious  Pain score: 4  Airway patency: patent  Nausea & Vomiting: no vomiting and no nausea  Complications: no  Cardiovascular status: hemodynamically stable  Respiratory status: room air and acceptable  Hydration status: stable  Comments: T 97.9

## 2020-06-10 NOTE — BRIEF OP NOTE
Brief Postoperative Note      Patient: Jama Nguyen  YOB: 1970  MRN: [id number]    Date of Procedure: 6/10/2020    Pre-Op Diagnosis: BILATERAL SCROTAL HERNIAS    Post-Op Diagnosis: Same       Procedure(s):  LAPAROSCOPIC BILATERAL INGUINAL HERNIA REPAIR WITH MESH    Surgeon(s):  Karely Salgado MD    Assistant:  First Assistant: Ricardo Batista    Anesthesia: General    Estimated Blood Loss (mL): 719    Complications: None    Specimens:   * No specimens in log *    Implants:  Implant Name Type Inv.  Item Serial No.  Lot No. LRB No. Used Action   MESH ULTRAPRO ADV SQ [id number] Mesh MESH ULTRAPRO ADV SQ [id number]  [place]: [facility] [id number]  1 Implanted         Drains: * No LDAs found *    Findings: Small indirect RIH       Large indirect LIH    Electronically signed by Karely Salgado MD on 6/10/2020 at 2:55 PM

## 2020-06-18 RX ORDER — OXYCODONE HYDROCHLORIDE AND ACETAMINOPHEN 5; 325 MG/1; MG/1
1 TABLET ORAL EVERY 4 HOURS PRN
Qty: 20 TABLET | Refills: 0 | Status: SHIPPED | OUTPATIENT
Start: 2020-06-18 | End: 2020-06-25

## 2020-06-26 ENCOUNTER — OFFICE VISIT (OUTPATIENT)
Dept: SURGERY | Age: 50
End: 2020-06-26

## 2020-06-26 VITALS
SYSTOLIC BLOOD PRESSURE: 130 MMHG | WEIGHT: 172.2 LBS | BODY MASS INDEX: 25.51 KG/M2 | HEIGHT: 69 IN | TEMPERATURE: 98 F | DIASTOLIC BLOOD PRESSURE: 76 MMHG

## 2020-06-26 PROCEDURE — 99024 POSTOP FOLLOW-UP VISIT: CPT | Performed by: SURGERY

## 2020-06-26 RX ORDER — HYDROCODONE BITARTRATE AND ACETAMINOPHEN 5; 325 MG/1; MG/1
1 TABLET ORAL EVERY 4 HOURS PRN
Qty: 25 TABLET | Refills: 0 | Status: SHIPPED | OUTPATIENT
Start: 2020-06-26 | End: 2020-07-03

## 2020-07-14 ENCOUNTER — TELEPHONE (OUTPATIENT)
Dept: SURGERY | Age: 50
End: 2020-07-14

## 2020-07-21 ENCOUNTER — OFFICE VISIT (OUTPATIENT)
Dept: SURGERY | Age: 50
End: 2020-07-21

## 2020-07-21 VITALS
HEIGHT: 69 IN | BODY MASS INDEX: 26.07 KG/M2 | DIASTOLIC BLOOD PRESSURE: 90 MMHG | WEIGHT: 176 LBS | TEMPERATURE: 98.1 F | HEART RATE: 81 BPM | SYSTOLIC BLOOD PRESSURE: 132 MMHG | OXYGEN SATURATION: 98 %

## 2020-07-21 PROCEDURE — 99024 POSTOP FOLLOW-UP VISIT: CPT | Performed by: SURGERY

## 2020-07-21 NOTE — PROGRESS NOTES
S: Mr. Randy Valerio returns for continued follow-up now roughly 6 weeks post laparoscopic bilateral inguinal hernia repair. He notes marked improvement in the appearance of the left side of the scrotum it is now down to about half the size it was when I last saw him. He still has mild discomfort as expected. He is undertaking light activity as requested. Continues with a good appetite and normal bowel and bladder habits. O: Abdomen is soft and nontender. His laparoscopy incisions are nicely healed. Right groin shows a solid repair. The cord and testicle are unremarkable. Left groin shows that the seroma is about half the size it was when I last saw him. The repair itself seems solid. The testicle is unremarkable. I cannot appreciate the cord in the midst of the seroma fluid. A: 1. Continued satisfactory recovery post laparoscopic bilateral inguinal hernia repair. The seroma cavity in the residual large left inguinal hernia area is slowly resolving. P: 1. He may gradually resume normal activity. 2.  He may return to work 2 weeks from today. He will forward any paperwork necessary to make that happen and will take care of it for him. 3.  Follow-up with me in 1 month.

## 2020-08-13 ENCOUNTER — OFFICE VISIT (OUTPATIENT)
Dept: SURGERY | Age: 50
End: 2020-08-13

## 2020-08-13 VITALS
HEIGHT: 69 IN | WEIGHT: 177.2 LBS | TEMPERATURE: 98 F | BODY MASS INDEX: 26.25 KG/M2 | DIASTOLIC BLOOD PRESSURE: 78 MMHG | SYSTOLIC BLOOD PRESSURE: 130 MMHG

## 2020-08-13 PROCEDURE — 99024 POSTOP FOLLOW-UP VISIT: CPT | Performed by: SURGERY

## 2020-09-24 ENCOUNTER — OFFICE VISIT (OUTPATIENT)
Dept: SURGERY | Age: 50
End: 2020-09-24

## 2020-09-24 VITALS
HEIGHT: 69 IN | WEIGHT: 176 LBS | TEMPERATURE: 98 F | SYSTOLIC BLOOD PRESSURE: 130 MMHG | BODY MASS INDEX: 26.07 KG/M2 | DIASTOLIC BLOOD PRESSURE: 78 MMHG

## 2020-09-24 PROCEDURE — 99024 POSTOP FOLLOW-UP VISIT: CPT | Performed by: SURGERY

## 2020-09-24 NOTE — PROGRESS NOTES
S: Mr. Bebeto Watkins returns for continued follow-up now 3-1/2 months post laparoscopic bilateral inguinal hernia repair. He notes that the hematoma and seroma involving the left side of the scrotum has markedly improved. He has no residual pain. He is returned to his normal activity including work. O: Abdomen is soft and nontender. His laparoscopy incisions remain well-healed. Right side shows a solid repair with no bulge when he strains. The cord and testicle are unremarkable. On the left side his hernia repair is solid and without bulge when he strains. He has a minimal residual seroma/hematoma along the proximal cord but the distal cord and testicle are unremarkable. A: 1. Continued steady recovery post laparoscopic bilateral inguinal hernia repair. His postop left-sided cord hematoma has just about completely resolved. P: 1.  I have encouraged him to continue with activity as desired. 2.  I suspect that his hematoma will resolve completely over the next month or 2 and he was pleased to hear this. 3.  He will follow-up with me as needed.

## 2021-02-02 ENCOUNTER — NURSE TRIAGE (OUTPATIENT)
Dept: OTHER | Facility: CLINIC | Age: 51
End: 2021-02-02

## 2021-02-02 ENCOUNTER — TELEPHONE (OUTPATIENT)
Dept: INTERNAL MEDICINE | Age: 51
End: 2021-02-02

## 2021-02-02 NOTE — TELEPHONE ENCOUNTER
[facility]  with red flag complaint. Brief description of triage: Pt states rash on ankles, legs, groin and elbows that started in January on ankles only. Pt is only person in house with symptoms. Triage indicates for patient to be seen by PCP. Pt does not have PCP yet, but was calling today to establish new patient appointment. Care advice provided, patient verbalizes understanding; denies any other questions or concerns; instructed to call back for any new or worsening symptoms. Writer provided warm transfer to [facility] for appointment scheduling. Attention Provider: Thank you for allowing me to participate in the care of your patient. The patient was connected to triage in response to information provided to the ECC. Please do not respond through this encounter as the response is not directed to a shared pool. Reason for Disposition   Mild widespread rash    Answer Assessment - Initial Assessment Questions  1. APPEARANCE of RASH: \"Describe the rash. \" (e.g., spots, blisters, raised areas, skin peeling, scaly)      Pt states scaly rash, like zits on legs and now they are flatter. Started on ankles and now it is on his legs, elbows and goes into groin    2. SIZE: \"How big are the spots? \" (e.g., tip of pen, eraser, coin; inches, centimeters)      Pt states the size of zits, as well as the biggest spots being the size of an eraser from a pencil    3. LOCATION: \"Where is the rash located? \"      Pt stats on his ankles, legs, elbows and groin    4. COLOR: \"What color is the rash? \" (Note: It is difficult to assess rash color in people with darker-colored skin. When this situation occurs, simply ask the caller to describe what they see. )      Pt states red    5. ONSET: \"When did the rash begin? \"      Pt states around the first of the year    6. FEVER: \"Do you have a fever? \" If so, ask: \"What is your temperature, how was it measured, and when did it start? \"      Denies    7. ITCHING: \"Does the rash itch? \" If so, ask: \"How bad is the itch? \" (Scale 1-10; or mild, moderate, severe)      Pt states at night 8/10  8. CAUSE: \"What do you think is causing the rash? \"      Pt is unsure. Pt googled scabies and allergic reaction as well as bed bugs    9. MEDICATION FACTORS: \"Have you started any new medications within the last 2 weeks? \" (e.g., antibiotics)       Pt denies. States he got new clothes around [place] and has washed them since. 10. OTHER SYMPTOMS: \"Do you have any other symptoms? \" (e.g., dizziness, headache, sore throat, joint pain)        Denies    11. PREGNANCY: \"Is there any chance you are pregnant? \" \"When was your last menstrual period? \"        N/A    Protocols used: RASH OR REDNESS - [facility]

## 2021-02-02 NOTE — TELEPHONE ENCOUNTER
Pt is wanting to est with Dr Melisa Miramontes and also has a rash they are concerned with I have advised them to go to urgent care for rash and that someone will call to schedule new patient appt

## 2021-02-04 ENCOUNTER — OFFICE VISIT (OUTPATIENT)
Dept: INTERNAL MEDICINE | Age: 51
End: 2021-02-04
Payer: MEDICAID

## 2021-02-04 VITALS
OXYGEN SATURATION: 95 % | SYSTOLIC BLOOD PRESSURE: 120 MMHG | HEIGHT: 69 IN | BODY MASS INDEX: 26.33 KG/M2 | DIASTOLIC BLOOD PRESSURE: 80 MMHG | HEART RATE: 68 BPM | WEIGHT: 177.8 LBS

## 2021-02-04 DIAGNOSIS — R21 RASH AND NONSPECIFIC SKIN ERUPTION: ICD-10-CM

## 2021-02-04 DIAGNOSIS — L29.9 GENERALIZED PRURITUS: ICD-10-CM

## 2021-02-04 DIAGNOSIS — Z13.1 DIABETES MELLITUS SCREENING: ICD-10-CM

## 2021-02-04 DIAGNOSIS — Z13.220 SCREENING FOR LIPID DISORDERS: ICD-10-CM

## 2021-02-04 DIAGNOSIS — Z13.29 SCREENING FOR THYROID DISORDER: ICD-10-CM

## 2021-02-04 DIAGNOSIS — Z11.59 ENCOUNTER FOR HEPATITIS C SCREENING TEST FOR LOW RISK PATIENT: ICD-10-CM

## 2021-02-04 DIAGNOSIS — Z11.59 ENCOUNTER FOR HEPATITIS C SCREENING TEST FOR LOW RISK PATIENT: Primary | ICD-10-CM

## 2021-02-04 DIAGNOSIS — Z13.31 DEPRESSION SCREENING: ICD-10-CM

## 2021-02-04 DIAGNOSIS — Z11.4 SCREENING FOR HIV WITHOUT PRESENCE OF RISK FACTORS: ICD-10-CM

## 2021-02-04 DIAGNOSIS — E55.9 HYPOVITAMINOSIS D: ICD-10-CM

## 2021-02-04 DIAGNOSIS — L40.4 GUTTATE PSORIASIS: ICD-10-CM

## 2021-02-04 DIAGNOSIS — Z87.891 PERSONAL HISTORY OF TOBACCO USE: ICD-10-CM

## 2021-02-04 PROBLEM — K40.20 NON-RECURRENT BILATERAL INGUINAL HERNIA WITHOUT OBSTRUCTION OR GANGRENE: Status: RESOLVED | Noted: 2020-06-10 | Resolved: 2021-02-04

## 2021-02-04 PROBLEM — E78.2 MIXED HYPERLIPIDEMIA: Status: ACTIVE | Noted: 2021-02-04

## 2021-02-04 LAB
ALBUMIN SERPL-MCNC: 4.7 G/DL (ref 3.5–5.2)
ALP BLD-CCNC: 95 U/L (ref 40–130)
ALT SERPL-CCNC: 16 U/L (ref 5–41)
ANION GAP SERPL CALCULATED.3IONS-SCNC: 14 MMOL/L (ref 7–19)
AST SERPL-CCNC: 20 U/L (ref 5–40)
BILIRUB SERPL-MCNC: 0.8 MG/DL (ref 0.2–1.2)
BILIRUBIN DIRECT: 0.2 MG/DL (ref 0–0.3)
BILIRUBIN, INDIRECT: 0.6 MG/DL (ref 0.1–1)
BUN BLDV-MCNC: 9 MG/DL (ref 6–20)
CALCIUM SERPL-MCNC: 9.3 MG/DL (ref 8.6–10)
CHLORIDE BLD-SCNC: 104 MMOL/L (ref 98–111)
CHOLESTEROL, TOTAL: 209 MG/DL (ref 160–199)
CO2: 23 MMOL/L (ref 22–29)
CREAT SERPL-MCNC: 0.7 MG/DL (ref 0.5–1.2)
GFR AFRICAN AMERICAN: >59
GFR NON-AFRICAN AMERICAN: >60
GLUCOSE BLD-MCNC: 127 MG/DL (ref 74–109)
HBA1C MFR BLD: 5.3 % (ref 4–6)
HCT VFR BLD CALC: 52.2 % (ref 42–52)
HDLC SERPL-MCNC: 59 MG/DL (ref 55–121)
HEMOGLOBIN: 17.7 G/DL (ref 14–18)
HEPATITIS C ANTIBODY INTERPRETATION: NORMAL
HIV-1 P24 AG: NORMAL
LDL CHOLESTEROL CALCULATED: 118 MG/DL
MCH RBC QN AUTO: 31.7 PG (ref 27–31)
MCHC RBC AUTO-ENTMCNC: 33.9 G/DL (ref 33–37)
MCV RBC AUTO: 93.5 FL (ref 80–94)
PDW BLD-RTO: 13.2 % (ref 11.5–14.5)
PLATELET # BLD: 235 K/UL (ref 130–400)
PMV BLD AUTO: 9.8 FL (ref 9.4–12.4)
POTASSIUM SERPL-SCNC: 3.9 MMOL/L (ref 3.5–5)
RAPID HIV 1&2: NORMAL
RBC # BLD: 5.58 M/UL (ref 4.7–6.1)
SODIUM BLD-SCNC: 141 MMOL/L (ref 136–145)
TOTAL PROTEIN: 7.5 G/DL (ref 6.6–8.7)
TRIGL SERPL-MCNC: 158 MG/DL (ref 0–149)
TSH REFLEX FT4: 1.09 UIU/ML (ref 0.35–5.5)
VITAMIN D 25-HYDROXY: 8.5 NG/ML
WBC # BLD: 8.4 K/UL (ref 4.8–10.8)

## 2021-02-04 PROCEDURE — 99203 OFFICE O/P NEW LOW 30 MIN: CPT | Performed by: INTERNAL MEDICINE

## 2021-02-04 RX ORDER — BETAMETHASONE DIPROPIONATE 0.05 %
OINTMENT (GRAM) TOPICAL
Qty: 50 G | Refills: 1 | Status: SHIPPED | OUTPATIENT
Start: 2021-02-04 | End: 2021-03-10

## 2021-02-04 RX ORDER — ERGOCALCIFEROL 1.25 MG/1
50000 CAPSULE ORAL WEEKLY
Qty: 12 CAPSULE | Refills: 1 | Status: SHIPPED | OUTPATIENT
Start: 2021-02-04 | End: 2021-06-23

## 2021-02-04 SDOH — ECONOMIC STABILITY: TRANSPORTATION INSECURITY
IN THE PAST 12 MONTHS, HAS THE LACK OF TRANSPORTATION KEPT YOU FROM MEDICAL APPOINTMENTS OR FROM GETTING MEDICATIONS?: NOT ASKED

## 2021-02-04 SDOH — ECONOMIC STABILITY: FOOD INSECURITY: WITHIN THE PAST 12 MONTHS, YOU WORRIED THAT YOUR FOOD WOULD RUN OUT BEFORE YOU GOT MONEY TO BUY MORE.: NEVER TRUE

## 2021-02-04 SDOH — ECONOMIC STABILITY: FOOD INSECURITY: WITHIN THE PAST 12 MONTHS, THE FOOD YOU BOUGHT JUST DIDN'T LAST AND YOU DIDN'T HAVE MONEY TO GET MORE.: NEVER TRUE

## 2021-02-04 SDOH — ECONOMIC STABILITY: TRANSPORTATION INSECURITY
IN THE PAST 12 MONTHS, HAS LACK OF TRANSPORTATION KEPT YOU FROM MEETINGS, WORK, OR FROM GETTING THINGS NEEDED FOR DAILY LIVING?: NOT ASKED

## 2021-02-04 SDOH — ECONOMIC STABILITY: INCOME INSECURITY: HOW HARD IS IT FOR YOU TO PAY FOR THE VERY BASICS LIKE FOOD, HOUSING, MEDICAL CARE, AND HEATING?: NOT ASKED

## 2021-02-04 ASSESSMENT — PATIENT HEALTH QUESTIONNAIRE - PHQ9
SUM OF ALL RESPONSES TO PHQ QUESTIONS 1-9: 0
SUM OF ALL RESPONSES TO PHQ9 QUESTIONS 1 & 2: 0
SUM OF ALL RESPONSES TO PHQ QUESTIONS 1-9: 0
SUM OF ALL RESPONSES TO PHQ QUESTIONS 1-9: 0
1. LITTLE INTEREST OR PLEASURE IN DOING THINGS: 0

## 2021-02-04 ASSESSMENT — ENCOUNTER SYMPTOMS
SORE THROAT: 0
COUGH: 0
DIARRHEA: 0
VOMITING: 0
SHORTNESS OF BREATH: 0
RHINORRHEA: 0
NAIL CHANGES: 0
EYE PAIN: 0

## 2021-02-04 NOTE — PROGRESS NOTES
Sharla Driscoll ( 1970) is a 53 y.o. male,  NEW , here for evaluation of the following chief complaint(s). Establish Care and Rash    8-15 minutes spent on assessing, reviewing and discussing Depression screen  ASSESSMENT/PLAN:  Problem List        Musculoskeletal and Integument    Guttate psoriasis     Patient given instructions about skin care in education materials  He has not seen a MD for a long time    Patient has had rash in his elbows on the right arm for a long time however has not paid attention to it but about a month ago he just has started to have old a small breakouts starting in his left leg progressing upwards to his groin to his abdomen                     Rash and nonspecific skin eruption       Other    Personal history of tobacco use  Not interested in smoking cessation at this time offered Wellbutrin Chantix      Discussed the importance of lab work and colon cancer screening patient did not show any interest            Results for orders placed or performed during the hospital encounter of 20   Culture, MRSA, Screening    Specimen: Nares; Nose   Result Value Ref Range    MRSA Culture Only No MRSA detected on culture    EKG 12 Lead   Result Value Ref Range    P-R Interval 142 ms    QRS Duration 102 ms    Q-T Interval 394 ms    QTc Calculation (Bazett) 397 ms    P Axis 63 degrees    T Axis 45 degrees       Return in about 4 months (around 2021).   42-year-old male  New patient visit, works as a  at [employer]  He smokes a pack a day for several years failed several attempts of quitting not wanting to engage in smoking cessation at this time  Educated about colon cancer screening he did not want that at the same time  Patient is here for about a duration of 1 month of rash is itchy located in the left leg and right leg has some lesions in his groin leg upper thigh and abdomen more scattered more severe at the lower extremity Breath sounds: Decreased breath sounds present. No wheezing or rales. Abdominal:      General: Bowel sounds are normal. There is no distension. Palpations: Abdomen is soft. There is no mass. Tenderness: There is no abdominal tenderness. There is no rebound. Musculoskeletal: Normal range of motion. General: No tenderness. Lymphadenopathy:      Cervical: No cervical adenopathy. Skin:     General: Skin is warm and dry. Findings: Rash present. Rash is crusting, macular, papular and scaling. Comments: Rash on the anterior left leg right leg scattered also seen in the thighs in his abdomen and arms are more not as severe no rashes seen in his flexor surfaces   Neurological:      General: No focal deficit present. Mental Status: He is alert and oriented to person, place, and time. Cranial Nerves: No cranial nerve deficit. Motor: Motor function is intact. Coordination: Coordination is intact. Gait: Gait is intact. Deep Tendon Reflexes: Reflexes normal.   Psychiatric:         Attention and Perception: Attention normal.         Mood and Affect: Affect is labile. Speech: Speech is rapid and pressured. Behavior: Behavior is hyperactive. Behavior is not agitated. Thought Content:  Thought content normal.         Judgment: Judgment normal.           (Time Documentation Optional [id number])    An electronic signaturewaas used to authenticate this note  -Jeff Herbert MD on 2/4/2021 at 8:34 AM

## 2021-02-04 NOTE — ASSESSMENT & PLAN NOTE
Patient given instructions about skin care in education materials  He has not seen a MD for a long time

## 2021-02-09 ENCOUNTER — TELEPHONE (OUTPATIENT)
Dept: INTERNAL MEDICINE | Age: 51
End: 2021-02-09

## 2021-02-09 RX ORDER — CLOBETASOL PROPIONATE 0.5 MG/G
CREAM TOPICAL DAILY
Qty: 45 G | Refills: 3 | Status: SHIPPED | OUTPATIENT
Start: 2021-02-09 | End: 2021-03-10

## 2021-02-09 NOTE — TELEPHONE ENCOUNTER
Received a denial for the Betamethasone ointment.  They did say he must try to of the preferred drugs first    Clobetasol propionate cream or gel  Halobetasol propionate cream or ointment  Betamethasone valerate cream or ointment

## 2021-03-08 ENCOUNTER — HOSPITAL ENCOUNTER (EMERGENCY)
Age: 51
Discharge: HOME | End: 2021-03-08
Payer: MEDICAID

## 2021-03-08 VITALS
DIASTOLIC BLOOD PRESSURE: 74 MMHG | WEIGHT: 170 LBS | TEMPERATURE: 98.1 F | HEIGHT: 69 IN | RESPIRATION RATE: 18 BRPM | SYSTOLIC BLOOD PRESSURE: 148 MMHG | OXYGEN SATURATION: 98 % | BODY MASS INDEX: 25.18 KG/M2 | HEART RATE: 87 BPM

## 2021-03-08 DIAGNOSIS — K64.5 THROMBOSED EXTERNAL HEMORRHOID: Primary | ICD-10-CM

## 2021-03-08 PROCEDURE — 6370000000 HC RX 637 (ALT 250 FOR IP): Performed by: NURSE PRACTITIONER

## 2021-03-08 PROCEDURE — 96372 THER/PROPH/DIAG INJ SC/IM: CPT

## 2021-03-08 PROCEDURE — 46083 INC THROMBOSED HROID XTRNL: CPT

## 2021-03-08 PROCEDURE — 99284 EMERGENCY DEPT VISIT MOD MDM: CPT

## 2021-03-08 PROCEDURE — 6360000002 HC RX W HCPCS: Performed by: NURSE PRACTITIONER

## 2021-03-08 RX ORDER — LIDOCAINE HYDROCHLORIDE 20 MG/ML
JELLY TOPICAL ONCE
Status: COMPLETED | OUTPATIENT
Start: 2021-03-08 | End: 2021-03-08

## 2021-03-08 RX ORDER — HYDROCORTISONE 25 MG/G
CREAM TOPICAL
Qty: 1 TUBE | Refills: 0 | Status: SHIPPED | OUTPATIENT
Start: 2021-03-08 | End: 2021-06-23

## 2021-03-08 RX ORDER — LIDOCAINE HYDROCHLORIDE 20 MG/ML
15 SOLUTION OROPHARYNGEAL PRN
Qty: 1 BOTTLE | Refills: 0 | Status: SHIPPED | OUTPATIENT
Start: 2021-03-08 | End: 2021-06-23

## 2021-03-08 RX ORDER — ONDANSETRON 4 MG/1
4 TABLET, ORALLY DISINTEGRATING ORAL ONCE
Status: COMPLETED | OUTPATIENT
Start: 2021-03-08 | End: 2021-03-08

## 2021-03-08 RX ORDER — HYDROMORPHONE HYDROCHLORIDE 1 MG/ML
1 INJECTION, SOLUTION INTRAMUSCULAR; INTRAVENOUS; SUBCUTANEOUS ONCE
Status: COMPLETED | OUTPATIENT
Start: 2021-03-08 | End: 2021-03-08

## 2021-03-08 RX ADMIN — LIDOCAINE HYDROCHLORIDE: 20 JELLY TOPICAL at 11:35

## 2021-03-08 RX ADMIN — ONDANSETRON 4 MG: 4 TABLET, ORALLY DISINTEGRATING ORAL at 11:18

## 2021-03-08 RX ADMIN — HYDROMORPHONE HYDROCHLORIDE 1 MG: 1 INJECTION, SOLUTION INTRAMUSCULAR; INTRAVENOUS; SUBCUTANEOUS at 11:18

## 2021-03-08 ASSESSMENT — ENCOUNTER SYMPTOMS
RECTAL PAIN: 1
DIARRHEA: 1
HEMATOCHEZIA: 1
CONSTIPATION: 1

## 2021-03-08 ASSESSMENT — PAIN SCALES - GENERAL: PAINLEVEL_OUTOF10: 2

## 2021-03-08 NOTE — ED PROVIDER NOTES
Details   vitamin D (ERGOCALCIFEROL) 1.25 MG (36236 UT) CAPS capsule Take 1 capsule by mouth once a week, Disp-12 capsule, R-1Normal      ibuprofen (ADVIL;MOTRIN) 200 MG tablet Take 200 mg by mouth every 6 hours as needed for PainHistorical Med      clobetasol prop emollient base 0.05 % CREA Apply topically daily, Topical, DAILY Starting Tue 2/9/2021, Until Thu 3/11/2021, For 30 days, Disp-45 g, R-3, Normal      betamethasone dipropionate (DIPROLENE) 0.05 % ointment Apply topically daily. , Disp-50 g, R-1, Normal             ALLERGIES     Patient has no known allergies. FAMILY HISTORY       Family History   Adopted: Yes   Problem Relation Age of Onset    Diabetes Brother           SOCIAL HISTORY       Social History     Socioeconomic History    Marital status:      Spouse name: None    Number of children: None    Years of education: None    Highest education level: None   Occupational History    None   Social Needs    Financial resource strain: None    Food insecurity     Worry: Never true     Inability: Never true    Transportation needs     Medical: None     Non-medical: None   Tobacco Use    Smoking status: Current Every Day Smoker     Packs/day: 1.00     Years: 32.00     Pack years: 32.00     Types: Cigarettes     Start date: 1989    Smokeless tobacco: Never Used    Tobacco comment: Work at [employer] and Sexual Activity    Alcohol use:  Yes     Alcohol/week: 6.0 standard drinks     Types: 6 Cans of beer per week     Frequency: 4 or more times a week     Drinks per session: 5 or 6     Comment: 6 pack per night    Drug use: No    Sexual activity: None   Lifestyle    Physical activity     Days per week: None     Minutes per session: None    Stress: None   Relationships    Social connections     Talks on phone: None     Gets together: None     Attends Sabianist service: None     Active member of club or organization: None     Attends meetings of clubs or organizations: None Relationship status: None    Intimate partner violence     Fear of current or ex partner: None     Emotionally abused: None     Physically abused: None     Forced sexual activity: None   Other Topics Concern    None   Social History Narrative    None       SCREENINGS      @FLOW([id number])@      PHYSICAL EXAM    (up to 7 for level 4, 8 or more for level 5)     ED Triage Vitals [03/08/21 0924]   BP Temp Temp src Pulse Resp SpO2 Height Weight   (!) 157/89 97.6 °F (36.4 °C) -- 84 20 95 % 5' 9\" (1.753 m) 170 lb (77.1 kg)       Physical Exam  Vitals signs and nursing note reviewed. Exam conducted with a chaperone present. Constitutional:       Appearance: He is well-developed. HENT:      Head: Normocephalic and atraumatic. Eyes:      General: No scleral icterus. Right eye: No discharge. Left eye: No discharge. Neck:      Musculoskeletal: Normal range of motion and neck supple. Cardiovascular:      Rate and Rhythm: Normal rate. Pulmonary:      Effort: No respiratory distress. Genitourinary:     Rectum: External hemorrhoid present. Comments: Thrombosed hemmorrhoid at 12 oclock  Neurological:      General: No focal deficit present. Mental Status: He is alert and oriented to person, place, and time.    Psychiatric:         Behavior: Behavior normal.         DIAGNOSTIC RESULTS     EKG: All EKG's are interpreted by the Emergency Department Physician who either signs or Co-signsthis chart in the absence of a cardiologist.        RADIOLOGY:   Non-plain filmimages such as CT, Ultrasound and MRI are read by the radiologist. Plain radiographic images are visualized and preliminarily interpreted by the emergency physician with the below findings:      Interpretation per the Radiologist below, if available at the time of this note:    No orders to display         ED BEDSIDEULTRASOUND:   Performed by ED Physician -none    LABS:  Labs Reviewed - No data to display    All other labs were within normal range or not returned as of this dictation. EMERGENCY DEPARTMENT COURSE and DIFFERENTIALDIAGNOSIS/MDM:   Vitals:    Vitals:    03/08/21 0924 03/08/21 1242   BP: (!) 157/89 (!) 148/74   Pulse: 84 87   Resp: 20 18   Temp: 97.6 °F (36.4 °C) 98.1 °F (36.7 °C)   TempSrc:  Oral   SpO2: 95% 98%   Weight: 170 lb (77.1 kg)    Height: 5' 9\" (1.753 m)            MDM  No large clot excised. Bleeding has decreased the tenseness in the hemmorrhoid. Pt to follow with a surgeon. Will give him anusol to treat. Encouraged sitz baths      CONSULTS:  None    PROCEDURES:  Unless otherwise noted below, none     Incision/Drainage    Date/Time: 3/8/2021 12:19 PM  Performed by: Kris Seip, APRN  Authorized by: Kris Seip, APRN     Consent:     Consent obtained:  Verbal    Consent given by:  Patient    Risks discussed:  Pain  Location:     Type:  External thrombosed hemorrhoid    Size:  3  Pre-procedure details:     Skin preparation:  Betadine  Anesthesia (see MAR for exact dosages): Anesthesia method:  Topical application and local infiltration    Local anesthetic:  Lidocaine 1% w/o epi  Procedure type:     Complexity:  Simple  Procedure details:     Incision types:  Stab incision    Scalpel blade:  11    Wound management:  Probed and deloculated    Drainage:  Bloody    Drainage amount: Moderate  Post-procedure details:     Patient tolerance of procedure: Tolerated well, no immediate complications        FINAL IMPRESSION      1.  Thrombosed external hemorrhoid        DISPOSITION/PLAN   DISPOSITION Decision To Discharge 03/08/2021 12:21:24 PM      PATIENT REFERRED TO:  Hammad Mason MD  [address] [place] [address]  [phone]            DISCHARGE MEDICATIONS:  Discharge Medication List as of 3/8/2021 12:25 PM      START taking these medications    Details   hydrocortisone (ANUSOL-HC) 2.5 % CREA rectal cream Apply rectally bid, Disp-1 Tube, R-0, Normal      lidocaine viscous hcl (XYLOCAINE) 2 % SOLN solution Place 15 mLs rectally as needed for Pain, Disp-1 Bottle, R-0Normal                (Please note that portions of this note were completed with a voice recognitionprogram.  Efforts were made to edit the dictations but occasionally words are mis-transcribed.)    HAN Hoyos (electronically signed)          HAN Hoyos  03/08/21 8061

## 2021-03-08 NOTE — ED NOTES
Bed: 17  Expected date:   Expected time:   Means of arrival:   Comments:  [facility]  03/08/21 5537

## 2021-03-10 ENCOUNTER — TELEPHONE (OUTPATIENT)
Dept: INTERNAL MEDICINE | Age: 51
End: 2021-03-10

## 2021-03-10 DIAGNOSIS — L40.4 GUTTATE PSORIASIS: Primary | ICD-10-CM

## 2021-03-10 RX ORDER — FLUTICASONE PROPIONATE 0.05 %
CREAM (GRAM) TOPICAL
Qty: 30 G | Refills: 3 | Status: SHIPPED | OUTPATIENT
Start: 2021-03-10 | End: 2021-04-09

## 2021-03-10 RX ORDER — CALCIPOTRIENE 50 UG/G
CREAM TOPICAL
Qty: 1 TUBE | Refills: 4 | Status: SHIPPED | OUTPATIENT
Start: 2021-03-10 | End: 2021-06-23

## 2021-03-10 RX ORDER — CLOBETASOL PROPIONATE 0.5 MG/G
CREAM TOPICAL DAILY
Qty: 45 G | Refills: 3 | Status: SHIPPED | OUTPATIENT
Start: 2021-03-10 | End: 2021-04-09

## 2021-03-10 NOTE — TELEPHONE ENCOUNTER
Received a fax from [employer] that the Clobetasol is not covered is there something else or over the counter he can use?

## 2021-03-12 ENCOUNTER — OFFICE VISIT (OUTPATIENT)
Dept: SURGERY | Age: 51
End: 2021-03-12
Payer: MEDICAID

## 2021-03-12 VITALS
TEMPERATURE: 97 F | DIASTOLIC BLOOD PRESSURE: 76 MMHG | SYSTOLIC BLOOD PRESSURE: 128 MMHG | WEIGHT: 176 LBS | BODY MASS INDEX: 26.07 KG/M2 | HEIGHT: 69 IN

## 2021-03-12 DIAGNOSIS — K64.5 THROMBOSED EXTERNAL HEMORRHOID: Primary | ICD-10-CM

## 2021-03-12 PROCEDURE — 1111F DSCHRG MED/CURRENT MED MERGE: CPT | Performed by: SURGERY

## 2021-03-12 PROCEDURE — 99204 OFFICE O/P NEW MOD 45 MIN: CPT | Performed by: SURGERY

## 2021-03-12 RX ORDER — LIDOCAINE HYDROCHLORIDE 20 MG/ML
JELLY TOPICAL
COMMUNITY
Start: 2021-03-09 | End: 2021-06-23

## 2021-03-12 NOTE — LETTER
[facility]  [address]  Phone: [phone]  Fax: [phone]    Vaishali Richardson MD        March 12, 2021     Patient: Emilee Chaparro   YOB: 1970   Date of Visit: 3/12/2021       To Whom It May Concern: It is my medical opinion that Emilee Chaparro should remain out of work until 3/22/2021. If you have any questions or concerns, please don't hesitate to call.     Sincerely,        Vaishali Ricahrdson MD

## 2021-03-18 ENCOUNTER — TELEPHONE (OUTPATIENT)
Dept: INTERNAL MEDICINE | Age: 51
End: 2021-03-18

## 2021-03-26 ASSESSMENT — ENCOUNTER SYMPTOMS
BACK PAIN: 0
DIARRHEA: 1
RHINORRHEA: 0
EYE PAIN: 0
COUGH: 0
BLOOD IN STOOL: 1
RECTAL PAIN: 1
SORE THROAT: 0
ABDOMINAL DISTENTION: 0
SHORTNESS OF BREATH: 0
APNEA: 1
CONSTIPATION: 1
EYE REDNESS: 0

## 2021-03-26 NOTE — PROGRESS NOTES
Mr. Fredderick Canavan is a 53year old male who presents with a complaint of a thrombosed hemorrhoid. He reports that this started a couple weeks ago. It worsened a little over a week ago. He went to the ER, where he was found to have a thrombosed hemorrhoid, which was lanced in the ER. The patient reports that he has continued to have pretty severe pain since then. He has a hard time sitting on the area. He has been using topicals, and taken sitz baths, which seem to have helped the most. He has had some intermittent blood with bowel movements, he feels this is mostly coming from the area of I&D. Past Medical History:   Diagnosis Date    Inguinal hernia bilateral, non-recurrent     Non-recurrent bilateral inguinal hernia without obstruction or gangrene 6/10/2020     Past Surgical History:   Procedure Laterality Date    DENTAL SURGERY      all upper teeth removed    HERNIA REPAIR Bilateral 6/10/2020    LAPAROSCOPIC BILATERAL INGUINAL HERNIA REPAIR WITH MESH performed by Delmer Corona MD at [facility] OR     Current Outpatient Medications   Medication Sig Dispense Refill    lidocaine (XYLOCAINE) 2 % jelly APPLY RECTALLY AS NEEDED FOR PAIN      calcipotriene (DOVONEX) 0.005 % cream Apply topically 2 times daily. 1 Tube 4    fluticasone (CUTIVATE) 0.05 % cream Apply topically 2 times daily as needed. 30 g 3    clobetasol prop emollient base 0.05 % CREA Apply topically daily 45 g 3    hydrocortisone (ANUSOL-HC) 2.5 % CREA rectal cream Apply rectally bid 1 Tube 0    lidocaine viscous hcl (XYLOCAINE) 2 % SOLN solution Place 15 mLs rectally as needed for Pain 1 Bottle 0    vitamin D (ERGOCALCIFEROL) 1.25 MG (93445 UT) CAPS capsule Take 1 capsule by mouth once a week 12 capsule 1    ibuprofen (ADVIL;MOTRIN) 200 MG tablet Take 200 mg by mouth every 6 hours as needed for Pain       No current facility-administered medications for this visit. Allergies: Patient has no known allergies. Family History   Adopted:  Yes Problem Relation Age of Onset    Diabetes Brother        Social History     Tobacco Use    Smoking status: Current Every Day Smoker     Packs/day: 1.00     Years: 32.00     Pack years: 32.00     Types: Cigarettes     Start date: 1989    Smokeless tobacco: Never Used    Tobacco comment: Work at "[employer]    Alcohol use: Yes     Alcohol/week: 6.0 standard drinks     Types: 6 Cans of beer per week     Frequency: 4 or more times a week     Drinks per session: 5 or 6     Comment: 6 pack per night       Review of Systems   Constitutional: Positive for fatigue. Negative for activity change and appetite change. HENT: Negative for congestion, rhinorrhea and sore throat. Eyes: Negative for pain, redness and visual disturbance. Respiratory: Positive for apnea. Negative for cough and shortness of breath. Cardiovascular: Negative for chest pain, palpitations and leg swelling. Gastrointestinal: Positive for blood in stool, constipation, diarrhea and rectal pain. Negative for abdominal distention. Endocrine: Negative for polydipsia, polyphagia and polyuria. Genitourinary: Negative for dysuria, frequency and hematuria. Musculoskeletal: Positive for gait problem. Negative for arthralgias and back pain. Skin: Negative for rash and wound. Neurological: Positive for headaches. Negative for seizures and numbness. Psychiatric/Behavioral: Negative for dysphoric mood. The patient is not nervous/anxious. Physical Exam  Vitals signs reviewed. Exam conducted with a chaperone present. Constitutional:       General: He is in acute distress (moderate distress due to pain). Appearance: He is normal weight. HENT:      Nose:      Comments: Wearing face mask  Eyes:      General: No scleral icterus. Pupils: Pupils are equal, round, and reactive to light. Neck:      Musculoskeletal: Neck supple. No neck rigidity.    Cardiovascular:      Rate and Rhythm: Normal rate and regular rhythm. Heart sounds: No murmur. Pulmonary:      Effort: Pulmonary effort is normal. No respiratory distress. Breath sounds: No wheezing. Abdominal:      General: There is no distension. Palpations: Abdomen is soft. Tenderness: There is no abdominal tenderness. Genitourinary:         Comments: Area of edematous external hemorrhoid with approximately 1.5 cm incision external to that  Musculoskeletal: Normal range of motion. General: No swelling or deformity. Skin:     General: Skin is warm and dry. Coloration: Skin is not jaundiced. Neurological:      General: No focal deficit present. Mental Status: He is alert and oriented to person, place, and time. Mental status is at baseline. Psychiatric:         Mood and Affect: Mood normal.         Behavior: Behavior normal.           CBC:   Lab Results   Component Value Date    WBC 8.4 02/04/2021    RBC 5.58 02/04/2021    HGB 17.7 02/04/2021    HCT 52.2 02/04/2021    MCV 93.5 02/04/2021    MCH 31.7 02/04/2021    MCHC 33.9 02/04/2021    RDW 13.2 02/04/2021     02/04/2021    MPV 9.8 02/04/2021     BMP:    Lab Results   Component Value Date     02/04/2021    K 3.9 02/04/2021     02/04/2021    CO2 23 02/04/2021    BUN 9 02/04/2021    LABALBU 4.7 02/04/2021    CREATININE 0.7 02/04/2021    CALCIUM 9.3 02/04/2021    GFRAA >59 02/04/2021    LABGLOM >60 02/04/2021    GLUCOSE 127 02/04/2021       Assessment and plan:  53year old male with thrombosed hemorrhoid  I discussed with the patient that he is far enough out from the hemorrhoid becoming thrombosed that the treatment is waiting and symptom control. If the thrombosed hemorrhoid is caught early enough, it can be excised or I&D'd. I feel like a good amount of his pain is coming from the incision site as it is rather large. I recommended the patient continue with sitz baths and symptomatic treatment.  The hemorrhoid is edematous but does not appear to have clot to be removed. Will have him follow up in about 3 weeks. Depending on how he is doing at that time, will discuss future hemorrhoidectomy.     Jason Jackson MD  3/26/2021  4:33 PM

## 2021-04-01 ENCOUNTER — OFFICE VISIT (OUTPATIENT)
Dept: SURGERY | Age: 51
End: 2021-04-01
Payer: MEDICAID

## 2021-04-01 VITALS
WEIGHT: 179.2 LBS | BODY MASS INDEX: 26.54 KG/M2 | SYSTOLIC BLOOD PRESSURE: 124 MMHG | DIASTOLIC BLOOD PRESSURE: 78 MMHG | HEIGHT: 69 IN | TEMPERATURE: 97.8 F

## 2021-04-01 DIAGNOSIS — K64.5 THROMBOSED EXTERNAL HEMORRHOID: Primary | ICD-10-CM

## 2021-04-01 PROCEDURE — 99213 OFFICE O/P EST LOW 20 MIN: CPT | Performed by: SURGERY

## 2021-04-23 ENCOUNTER — OFFICE VISIT (OUTPATIENT)
Dept: SURGERY | Age: 51
End: 2021-04-23
Payer: MEDICAID

## 2021-04-23 VITALS
WEIGHT: 179 LBS | OXYGEN SATURATION: 98 % | TEMPERATURE: 97.9 F | BODY MASS INDEX: 26.51 KG/M2 | HEART RATE: 70 BPM | HEIGHT: 69 IN

## 2021-04-23 DIAGNOSIS — K64.5 THROMBOSED EXTERNAL HEMORRHOID: Primary | ICD-10-CM

## 2021-04-23 PROCEDURE — 99212 OFFICE O/P EST SF 10 MIN: CPT | Performed by: SURGERY

## 2021-05-03 NOTE — PROGRESS NOTES
Subjective:  Mr. Wilda Mendoza is here to follow up concerning his hemorrhoidal disease. He previously underwent I&D of thrombosed hemorrhoid in the ER. Following this, he had continued pain and then skin breakdown from topical treatments and sitz baths. He has been using a barrier cream since his last appointment here, and reports that he is feeling quite a bit better. Objective:  Vitals reviewed  Chaperone present  General: NAD, AAO  Chest: Regular, non-labored  Buttock: improvement in edema of hemorrhoid tissue, improved erythema and skin breakdown. Still just a little in the perianal area    Assessment and plan:  49year old male with h/o I&D of thrombosed hemorrhoid  1) Instructed the patient to continue working on his bowel regimen. 2) Limit sitz baths at this point and continue with barrier cream  3) Discussed with patient that, if he continues to have improvement, we may not have to do any hemorrhoid surgery, which he is okay with  4) Follow up in 3-4 weeks.

## 2021-05-14 ENCOUNTER — TELEPHONE (OUTPATIENT)
Dept: INTERNAL MEDICINE | Age: 51
End: 2021-05-14

## 2021-05-14 DIAGNOSIS — Z00.00 ROUTINE ADULT HEALTH MAINTENANCE: Primary | ICD-10-CM

## 2021-05-14 DIAGNOSIS — E78.5 DYSLIPIDEMIA: ICD-10-CM

## 2021-05-14 DIAGNOSIS — E55.9 HYPOVITAMINOSIS D: ICD-10-CM

## 2021-05-21 ENCOUNTER — OFFICE VISIT (OUTPATIENT)
Dept: SURGERY | Age: 51
End: 2021-05-21
Payer: MEDICAID

## 2021-05-21 VITALS
BODY MASS INDEX: 26.51 KG/M2 | WEIGHT: 179 LBS | TEMPERATURE: 98.4 F | DIASTOLIC BLOOD PRESSURE: 88 MMHG | HEIGHT: 69 IN | SYSTOLIC BLOOD PRESSURE: 140 MMHG

## 2021-05-21 DIAGNOSIS — K64.5 THROMBOSED EXTERNAL HEMORRHOID: Primary | ICD-10-CM

## 2021-05-21 PROCEDURE — 99212 OFFICE O/P EST SF 10 MIN: CPT | Performed by: SURGERY

## 2021-06-23 ENCOUNTER — HOSPITAL ENCOUNTER (OUTPATIENT)
Dept: GENERAL RADIOLOGY | Age: 51
Discharge: HOME | End: 2021-06-23
Payer: MEDICAID

## 2021-06-23 ENCOUNTER — OFFICE VISIT (OUTPATIENT)
Dept: PRIMARY CARE CLINIC | Age: 51
End: 2021-06-23
Payer: MEDICAID

## 2021-06-23 VITALS
WEIGHT: 173.5 LBS | OXYGEN SATURATION: 97 % | TEMPERATURE: 97.6 F | DIASTOLIC BLOOD PRESSURE: 82 MMHG | HEIGHT: 69 IN | BODY MASS INDEX: 25.7 KG/M2 | SYSTOLIC BLOOD PRESSURE: 122 MMHG | HEART RATE: 74 BPM

## 2021-06-23 DIAGNOSIS — G89.29 CHRONIC PAIN OF RIGHT KNEE: ICD-10-CM

## 2021-06-23 DIAGNOSIS — M77.11 LATERAL EPICONDYLITIS OF RIGHT ELBOW: Primary | ICD-10-CM

## 2021-06-23 DIAGNOSIS — M25.561 CHRONIC PAIN OF RIGHT KNEE: ICD-10-CM

## 2021-06-23 PROCEDURE — 73562 X-RAY EXAM OF KNEE 3: CPT

## 2021-06-23 PROCEDURE — 99213 OFFICE O/P EST LOW 20 MIN: CPT | Performed by: PEDIATRICS

## 2021-06-23 RX ORDER — NABUMETONE 750 MG/1
750 TABLET, FILM COATED ORAL 2 TIMES DAILY
Qty: 60 TABLET | Refills: 3 | Status: SHIPPED | OUTPATIENT
Start: 2021-06-23 | End: 2022-05-09

## 2021-06-23 ASSESSMENT — ENCOUNTER SYMPTOMS
EYES NEGATIVE: 1
RESPIRATORY NEGATIVE: 1
ALLERGIC/IMMUNOLOGIC NEGATIVE: 1
GASTROINTESTINAL NEGATIVE: 1

## 2021-06-28 ENCOUNTER — PATIENT MESSAGE (OUTPATIENT)
Dept: PRIMARY CARE CLINIC | Age: 51
End: 2021-06-28

## 2021-06-29 ENCOUNTER — TELEPHONE (OUTPATIENT)
Dept: PRIMARY CARE CLINIC | Age: 51
End: 2021-06-29

## 2021-06-29 DIAGNOSIS — M25.561 CHRONIC PAIN OF RIGHT KNEE: Primary | ICD-10-CM

## 2021-06-29 DIAGNOSIS — G89.29 CHRONIC PAIN OF RIGHT KNEE: Primary | ICD-10-CM

## 2021-06-29 NOTE — TELEPHONE ENCOUNTER
----- Message from Judit Correa DO sent at 6/24/2021  7:25 AM CDT -----  Knee x-ray looks normal.We can check an MRI to see if there is any soft tissue or ligamentous injury. Some insurance companies will make his wait 6 weeks before we can do this test.We will try to order it now if you like

## 2021-06-30 NOTE — TELEPHONE ENCOUNTER
Pt called back for results. He would like for us to go ahead and order MRI and see if they will approve it. I will order this for pt.

## 2021-06-30 NOTE — TELEPHONE ENCOUNTER
Spoke with pt and he received a letter from his insurance stating that he must try and fail two of the preferred medications prior to getting the nabumetone.      Preferred are:    Celecoxib  Diclofenac gel  Indomethacin  Meloxicam   Naproxen    Will send to provider for recommendations

## 2021-07-01 RX ORDER — NAPROXEN 500 MG/1
500 TABLET ORAL 2 TIMES DAILY WITH MEALS
Qty: 14 TABLET | Refills: 1 | Status: SHIPPED | OUTPATIENT
Start: 2021-07-01 | End: 2022-05-09

## 2021-07-01 NOTE — TELEPHONE ENCOUNTER
We can start with naproxen sodium 500 mg twice daily with food or milk x2 weeks  Dispense #14 with 1 refill

## 2021-07-12 ENCOUNTER — TELEPHONE (OUTPATIENT)
Dept: PRIMARY CARE CLINIC | Age: 51
End: 2021-07-12

## 2021-07-12 NOTE — TELEPHONE ENCOUNTER
Patient left a  asking about authorization for his MRI that is scheduled  Returned patient's call, advised him the request has been sent to his insurance company for review but we do not have a response as of yet.  Sent an inbox to the pre-cert dept for an update  Advised patient once we had an answer we would give him a call

## 2021-07-13 NOTE — TELEPHONE ENCOUNTER
This still has not been approved or even looks like it has been worked on yet.  Usually they wait until the day before to PA

## 2021-07-20 NOTE — TELEPHONE ENCOUNTER
Left message for patient to return call at convenience regarding MRI approval. Looks like this has been approved through insurance

## 2021-07-28 ENCOUNTER — TELEPHONE (OUTPATIENT)
Dept: PRIMARY CARE CLINIC | Age: 51
End: 2021-07-28

## 2021-07-28 ENCOUNTER — HOSPITAL ENCOUNTER (OUTPATIENT)
Dept: MRI IMAGING | Age: 51
Discharge: HOME | End: 2021-07-28
Payer: MEDICAID

## 2021-07-28 DIAGNOSIS — S83.241A TEAR OF MEDIAL MENISCUS OF RIGHT KNEE, CURRENT, UNSPECIFIED TEAR TYPE, INITIAL ENCOUNTER: Primary | ICD-10-CM

## 2021-07-28 DIAGNOSIS — G89.29 CHRONIC PAIN OF RIGHT KNEE: ICD-10-CM

## 2021-07-28 DIAGNOSIS — M25.561 CHRONIC PAIN OF RIGHT KNEE: ICD-10-CM

## 2021-07-28 PROCEDURE — 73721 MRI JNT OF LWR EXTRE W/O DYE: CPT

## 2021-07-28 NOTE — TELEPHONE ENCOUNTER
----- Message from Claudeen Osmond, APRN sent at 7/28/2021  1:51 PM CDT -----  Please call patient and let them know results. MRI shows degenerative changes as well as a tear of the medial meniscus.   Recommend referral to orthopedics

## 2021-07-29 NOTE — TELEPHONE ENCOUNTER
Called patient, spoke with: Patient regarding the results of the patients most recent MRI. I advised Patient of Dr. Delmer Medeiros recommendations.    Patient did voice understanding

## 2022-05-09 ENCOUNTER — OFFICE VISIT (OUTPATIENT)
Dept: PRIMARY CARE CLINIC | Age: 52
End: 2022-05-09
Payer: MEDICAID

## 2022-05-09 VITALS
TEMPERATURE: 97.6 F | BODY MASS INDEX: 25.33 KG/M2 | OXYGEN SATURATION: 97 % | DIASTOLIC BLOOD PRESSURE: 87 MMHG | WEIGHT: 171 LBS | HEART RATE: 70 BPM | SYSTOLIC BLOOD PRESSURE: 140 MMHG | RESPIRATION RATE: 15 BRPM | HEIGHT: 69 IN

## 2022-05-09 DIAGNOSIS — H65.03 NON-RECURRENT ACUTE SEROUS OTITIS MEDIA OF BOTH EARS: Primary | ICD-10-CM

## 2022-05-09 PROCEDURE — 99213 OFFICE O/P EST LOW 20 MIN: CPT | Performed by: NURSE PRACTITIONER

## 2022-05-09 RX ORDER — AMOXICILLIN AND CLAVULANATE POTASSIUM 875; 125 MG/1; MG/1
1 TABLET, FILM COATED ORAL 2 TIMES DAILY
Qty: 20 TABLET | Refills: 0 | Status: SHIPPED | OUTPATIENT
Start: 2022-05-09 | End: 2022-05-19

## 2022-05-09 ASSESSMENT — ENCOUNTER SYMPTOMS
WHEEZING: 0
BLOOD IN STOOL: 0
CONSTIPATION: 0
SORE THROAT: 0
EYE DISCHARGE: 0
DIARRHEA: 0
ABDOMINAL PAIN: 0
EYE ITCHING: 0
COLOR CHANGE: 0
SINUS PRESSURE: 0
SHORTNESS OF BREATH: 0
COUGH: 0
VOMITING: 0
NAUSEA: 0
RHINORRHEA: 0

## 2022-05-09 NOTE — PATIENT INSTRUCTIONS
Patient Education        Middle Ear Fluid: Care Instructions  Overview     Fluid often builds up inside the ear during a cold or allergies. Usually the fluid drains away, but sometimes a small tube in the ear, called the eustachiantube, stays blocked for months. Symptoms of fluid buildup may include:   Popping, ringing, or a feeling of fullness or pressure in the ear.  Trouble hearing.  Balance problems and dizziness. In most cases, you can treat yourself at home. Follow-up care is a key part of your treatment and safety. Be sure to make and go to all appointments, and call your doctor if you are having problems. It's also a good idea to know your test results and keep alist of the medicines you take. How can you care for yourself at home?  In most cases, the fluid clears up within a few months without treatment. You may need more tests if the fluid does not clear up after 3 months.  For adults, decongestants that you take by mouth or spray into your nose may be helpful. If you have allergies, the doctor may prescribe a steroid medicine that you spray into your nose. When should you call for help? Call your doctor now or seek immediate medical care if:     You have symptoms of infection, such as:  ? Increased pain, swelling, warmth, or redness. ? Pus draining from the area. ? A fever. Watch closely for changes in your health, and be sure to contact your doctor if:     You notice changes in hearing.      You do not get better as expected. Where can you learn more? Go to https://[employer]. org and sign in to your [employer] account. Enter H087 in the [facility] box to learn more about \"Middle Ear Fluid: Care Instructions. \"     If you do not have an account, please click on the \"Sign Up Now\" link. Current as of: September 8, 2021               Content Version: 13.2  © 6046-5405 [employer], [employer]. Care instructions adapted under license by [facility] ([facility]). If you have questions about a medical condition or this instruction, always ask your healthcare professional. [place] [address] any warranty or liability for your use of this information.

## 2022-05-09 NOTE — PROGRESS NOTES
[address] [facility] [address] [facility]  Dept: [phone]  Dept Fax: [phone]  Loc: [phone]    Errol Grubbs is a 47 y.o. male who presents today for his medical conditions/complaints as noted below. Errol Grubbs is complaining of Otalgia (LT ear pain x 1 week)        HPI:   Otalgia   There is pain in the left ear. This is a new problem. The current episode started in the past 7 days. The problem has been waxing and waning. There has been no fever. The pain is mild. Pertinent negatives include no abdominal pain, coughing, diarrhea, headaches, rash, rhinorrhea, sore throat or vomiting. He has tried nothing for the symptoms. Past Medical History:   Diagnosis Date    Inguinal hernia bilateral, non-recurrent     Non-recurrent bilateral inguinal hernia without obstruction or gangrene 6/10/2020       Past Surgical History:   Procedure Laterality Date    DENTAL SURGERY      all upper teeth removed    HERNIA REPAIR Bilateral 6/10/2020    LAPAROSCOPIC BILATERAL INGUINAL HERNIA REPAIR WITH MESH performed by Marc Thurman MD at [facility] OR       Family History   Adopted: Yes   Problem Relation Age of Onset    Diabetes Brother        Social History     Tobacco Use    Smoking status: Current Every Day Smoker     Packs/day: 1.00     Years: 32.00     Pack years: 32.00     Types: Cigarettes     Start date: 1989    Smokeless tobacco: Never Used    Tobacco comment: Work at [employer]    Alcohol use:  Yes     Alcohol/week: 6.0 standard drinks     Types: 6 Cans of beer per week     Comment: 6 pack per night        Current Outpatient Medications   Medication Sig Dispense Refill    amoxicillin-clavulanate (AUGMENTIN) 875-125 MG per tablet Take 1 tablet by mouth 2 times daily for 10 days 20 tablet 0    ibuprofen (ADVIL;MOTRIN) 200 MG tablet Take 200 mg by mouth every 6 hours as needed for Pain       No current facility-administered medications for this visit. No Known Allergies    Health Maintenance   Topic Date Due    COVID-19 Vaccine (1) Never done    Pneumococcal 0-64 years Vaccine (1 - PCV) Never done    DTaP/Tdap/Td vaccine (1 - Tdap) Never done    Colorectal Cancer Screen  Never done    Shingles vaccine (1 of 2) Never done    Low dose CT lung screening  Never done    Depression Screen  02/04/2022    Flu vaccine (Season Ended) 09/01/2022    Diabetes screen  02/04/2024    Lipids  02/04/2026    Hepatitis C screen  Completed    HIV screen  Completed    Hepatitis A vaccine  Aged Out    Hepatitis B vaccine  Aged Out    Hib vaccine  Aged Out    Meningococcal (ACWY) vaccine  Aged Out       Subjective:   Review of Systems   Constitutional: Negative for activity change, appetite change, chills, fatigue and fever. HENT: Positive for ear pain. Negative for congestion, rhinorrhea, sinus pressure and sore throat. Eyes: Negative for discharge and itching. Respiratory: Negative for cough, shortness of breath and wheezing. Cardiovascular: Negative for chest pain. Gastrointestinal: Negative for abdominal pain, blood in stool, constipation, diarrhea, nausea and vomiting. Musculoskeletal: Negative for myalgias. Skin: Negative for color change and rash. Neurological: Negative for dizziness and headaches. All other systems reviewed and are negative. Objective    Physical Exam  Vitals and nursing note reviewed. Constitutional:       General: He is not in acute distress. Appearance: Normal appearance. HENT:      Head: Normocephalic and atraumatic. Right Ear: Ear canal normal. A middle ear effusion is present. Tympanic membrane is erythematous. Left Ear: Ear canal normal. A middle ear effusion is present. Tympanic membrane is injected. Mouth/Throat:      Mouth: Mucous membranes are moist.      Pharynx: No posterior oropharyngeal erythema.    Eyes:      Extraocular Movements: Extraocular movements intact. Pupils: Pupils are equal, round, and reactive to light. Cardiovascular:      Rate and Rhythm: Normal rate and regular rhythm. Pulses: Normal pulses. Heart sounds: Normal heart sounds. No murmur heard. Pulmonary:      Effort: Pulmonary effort is normal. No respiratory distress. Breath sounds: Normal breath sounds. No wheezing. Skin:     General: Skin is warm. Capillary Refill: Capillary refill takes less than 2 seconds. Coloration: Skin is not pale. Findings: No rash. Neurological:      General: No focal deficit present. Mental Status: He is alert and oriented to person, place, and time. Psychiatric:         Attention and Perception: Attention normal.         Mood and Affect: Mood normal.         Behavior: Behavior normal. Behavior is cooperative. Thought Content: Thought content normal.         BP (!) 140/87 (Site: Right Upper Arm, Position: Sitting, Cuff Size: Medium Adult)   Pulse 70   Temp 97.6 °F (36.4 °C) (Temporal)   Resp 15   Ht 5' 9\" (1.753 m)   Wt 171 lb (77.6 kg)   SpO2 97%   BMI 25.25 kg/m²     Assessment         Diagnosis Orders   1. Non-recurrent acute serous otitis media of both ears         Plan   - Take full course of antibiotics   - Increase fluid intake  - Recommended OTC flonase  - Recommended OTC claritin or zyrtec  - The patient is to follow up with PCP or return to clinic if symptoms worsen/fail to improve. Orders Placed This Encounter   Medications    amoxicillin-clavulanate (AUGMENTIN) 875-125 MG per tablet     Sig: Take 1 tablet by mouth 2 times daily for 10 days     Dispense:  20 tablet     Refill:  0      New Prescriptions    AMOXICILLIN-CLAVULANATE (AUGMENTIN) 875-125 MG PER TABLET    Take 1 tablet by mouth 2 times daily for 10 days        Return if symptoms worsen or fail to improve. Discussed use, benefits, and side effects of any prescribed medications.  All patient questions were answered. Patient voiced understanding of care plan. Patient was given educational materials - see patient instructions below. Patient Instructions       Patient Education        Middle Ear Fluid: Care Instructions  Overview     Fluid often builds up inside the ear during a cold or allergies. Usually the fluid drains away, but sometimes a small tube in the ear, called the eustachiantube, stays blocked for months. Symptoms of fluid buildup may include:   Popping, ringing, or a feeling of fullness or pressure in the ear.  Trouble hearing.  Balance problems and dizziness. In most cases, you can treat yourself at home. Follow-up care is a key part of your treatment and safety. Be sure to make and go to all appointments, and call your doctor if you are having problems. It's also a good idea to know your test results and keep alist of the medicines you take. How can you care for yourself at home?  In most cases, the fluid clears up within a few months without treatment. You may need more tests if the fluid does not clear up after 3 months.  For adults, decongestants that you take by mouth or spray into your nose may be helpful. If you have allergies, the doctor may prescribe a steroid medicine that you spray into your nose. When should you call for help? Call your doctor now or seek immediate medical care if:     You have symptoms of infection, such as:  ? Increased pain, swelling, warmth, or redness. ? Pus draining from the area. ? A fever. Watch closely for changes in your health, and be sure to contact your doctor if:     You notice changes in hearing.      You do not get better as expected. Where can you learn more? Go to [email or web address].[email or web address]. org and sign in to your Cell[employer] account. Enter U041 in the [facility] box to learn more about \"Middle Ear Fluid: Care Instructions. \"     If you do not have an account, please click on the \"Sign Up Now\" link.  Current as of: September 8, 2021               Content Version: 13.2  © 7161-0069 Healthwise, Incorporated. Care instructions adapted under license by [facility] ([facility]). If you have questions about a medical condition or this instruction, always ask your healthcare professional. [place] [address] any warranty or liability for your use of this information.                Electronically signed by HAN Ray CNP on 5/9/2022 at 2:13 PM

## 2022-09-29 ENCOUNTER — APPOINTMENT (OUTPATIENT)
Dept: CT IMAGING | Age: 52
End: 2022-09-29
Payer: MEDICAID

## 2022-09-29 ENCOUNTER — HOSPITAL ENCOUNTER (EMERGENCY)
Age: 52
Discharge: HOME | End: 2022-09-29
Payer: MEDICAID

## 2022-09-29 VITALS
SYSTOLIC BLOOD PRESSURE: 156 MMHG | RESPIRATION RATE: 20 BRPM | TEMPERATURE: 97.9 F | OXYGEN SATURATION: 97 % | HEART RATE: 58 BPM | DIASTOLIC BLOOD PRESSURE: 92 MMHG

## 2022-09-29 DIAGNOSIS — S09.90XA CLOSED HEAD INJURY, INITIAL ENCOUNTER: ICD-10-CM

## 2022-09-29 DIAGNOSIS — R07.89 CHEST WALL PAIN: ICD-10-CM

## 2022-09-29 DIAGNOSIS — S16.1XXA STRAIN OF NECK MUSCLE, INITIAL ENCOUNTER: ICD-10-CM

## 2022-09-29 DIAGNOSIS — R55 SYNCOPE AND COLLAPSE: Primary | ICD-10-CM

## 2022-09-29 LAB
ALBUMIN SERPL-MCNC: 4.5 G/DL (ref 3.5–5.2)
ALP BLD-CCNC: 83 U/L (ref 40–130)
ALT SERPL-CCNC: 19 U/L (ref 5–41)
ANION GAP SERPL CALCULATED.3IONS-SCNC: 12 MMOL/L (ref 7–19)
APTT: 31.4 SEC (ref 26–36.2)
AST SERPL-CCNC: 23 U/L (ref 5–40)
BASOPHILS ABSOLUTE: 0.1 K/UL (ref 0–0.2)
BASOPHILS RELATIVE PERCENT: 0.7 % (ref 0–1)
BILIRUB SERPL-MCNC: 1.6 MG/DL (ref 0.2–1.2)
BUN BLDV-MCNC: 9 MG/DL (ref 6–20)
CALCIUM SERPL-MCNC: 9.5 MG/DL (ref 8.6–10)
CHLORIDE BLD-SCNC: 103 MMOL/L (ref 98–111)
CO2: 26 MMOL/L (ref 22–29)
CREAT SERPL-MCNC: 0.7 MG/DL (ref 0.5–1.2)
EOSINOPHILS ABSOLUTE: 0.2 K/UL (ref 0–0.6)
EOSINOPHILS RELATIVE PERCENT: 1.9 % (ref 0–5)
GFR AFRICAN AMERICAN: >59
GFR NON-AFRICAN AMERICAN: >60
GLUCOSE BLD-MCNC: 84 MG/DL (ref 74–109)
HCT VFR BLD CALC: 52 % (ref 42–52)
HEMOGLOBIN: 17.3 G/DL (ref 14–18)
IMMATURE GRANULOCYTES #: 0 K/UL
INR BLD: 0.96 (ref 0.88–1.18)
LYMPHOCYTES ABSOLUTE: 3.3 K/UL (ref 1.1–4.5)
LYMPHOCYTES RELATIVE PERCENT: 34.5 % (ref 20–40)
MCH RBC QN AUTO: 31.7 PG (ref 27–31)
MCHC RBC AUTO-ENTMCNC: 33.3 G/DL (ref 33–37)
MCV RBC AUTO: 95.4 FL (ref 80–94)
MONOCYTES ABSOLUTE: 0.7 K/UL (ref 0–0.9)
MONOCYTES RELATIVE PERCENT: 7.7 % (ref 0–10)
NEUTROPHILS ABSOLUTE: 5.3 K/UL (ref 1.5–7.5)
NEUTROPHILS RELATIVE PERCENT: 55 % (ref 50–65)
PDW BLD-RTO: 13.3 % (ref 11.5–14.5)
PLATELET # BLD: 249 K/UL (ref 130–400)
PMV BLD AUTO: 9.2 FL (ref 9.4–12.4)
POTASSIUM SERPL-SCNC: 4.1 MMOL/L (ref 3.5–5)
PROTHROMBIN TIME: 12.7 SEC (ref 12–14.6)
RBC # BLD: 5.45 M/UL (ref 4.7–6.1)
SODIUM BLD-SCNC: 141 MMOL/L (ref 136–145)
TOTAL PROTEIN: 7.2 G/DL (ref 6.6–8.7)
TROPONIN: <0.01 NG/ML (ref 0–0.03)
WBC # BLD: 9.6 K/UL (ref 4.8–10.8)

## 2022-09-29 PROCEDURE — 96375 TX/PRO/DX INJ NEW DRUG ADDON: CPT

## 2022-09-29 PROCEDURE — 72125 CT NECK SPINE W/O DYE: CPT | Performed by: RADIOLOGY

## 2022-09-29 PROCEDURE — 85730 THROMBOPLASTIN TIME PARTIAL: CPT

## 2022-09-29 PROCEDURE — 70450 CT HEAD/BRAIN W/O DYE: CPT | Performed by: RADIOLOGY

## 2022-09-29 PROCEDURE — 93005 ELECTROCARDIOGRAM TRACING: CPT | Performed by: PHYSICIAN ASSISTANT

## 2022-09-29 PROCEDURE — 80053 COMPREHEN METABOLIC PANEL: CPT

## 2022-09-29 PROCEDURE — 72125 CT NECK SPINE W/O DYE: CPT

## 2022-09-29 PROCEDURE — 70450 CT HEAD/BRAIN W/O DYE: CPT

## 2022-09-29 PROCEDURE — 74177 CT ABD & PELVIS W/CONTRAST: CPT | Performed by: RADIOLOGY

## 2022-09-29 PROCEDURE — 74177 CT ABD & PELVIS W/CONTRAST: CPT

## 2022-09-29 PROCEDURE — 85610 PROTHROMBIN TIME: CPT

## 2022-09-29 PROCEDURE — 84484 ASSAY OF TROPONIN QUANT: CPT

## 2022-09-29 PROCEDURE — 96374 THER/PROPH/DIAG INJ IV PUSH: CPT

## 2022-09-29 PROCEDURE — 36415 COLL VENOUS BLD VENIPUNCTURE: CPT

## 2022-09-29 PROCEDURE — 71260 CT THORAX DX C+: CPT

## 2022-09-29 PROCEDURE — 71260 CT THORAX DX C+: CPT | Performed by: RADIOLOGY

## 2022-09-29 PROCEDURE — 85025 COMPLETE CBC W/AUTO DIFF WBC: CPT

## 2022-09-29 PROCEDURE — 6360000002 HC RX W HCPCS: Performed by: PHYSICIAN ASSISTANT

## 2022-09-29 PROCEDURE — 99285 EMERGENCY DEPT VISIT HI MDM: CPT

## 2022-09-29 PROCEDURE — 6360000004 HC RX CONTRAST MEDICATION: Performed by: PHYSICIAN ASSISTANT

## 2022-09-29 RX ORDER — METHOCARBAMOL 500 MG/1
500 TABLET, FILM COATED ORAL 3 TIMES DAILY
Qty: 15 TABLET | Refills: 0 | Status: SHIPPED | OUTPATIENT
Start: 2022-09-29 | End: 2022-10-04

## 2022-09-29 RX ORDER — MORPHINE SULFATE 4 MG/ML
4 INJECTION, SOLUTION INTRAMUSCULAR; INTRAVENOUS ONCE
Status: COMPLETED | OUTPATIENT
Start: 2022-09-29 | End: 2022-09-29

## 2022-09-29 RX ORDER — LORAZEPAM 2 MG/ML
0.5 INJECTION INTRAMUSCULAR ONCE
Status: COMPLETED | OUTPATIENT
Start: 2022-09-29 | End: 2022-09-29

## 2022-09-29 RX ADMIN — IOPAMIDOL 90 ML: 755 INJECTION, SOLUTION INTRAVENOUS at 20:05

## 2022-09-29 RX ADMIN — MORPHINE SULFATE 4 MG: 4 INJECTION, SOLUTION INTRAMUSCULAR; INTRAVENOUS at 20:55

## 2022-09-29 RX ADMIN — LORAZEPAM 0.5 MG: 2 INJECTION, SOLUTION INTRAMUSCULAR; INTRAVENOUS at 22:03

## 2022-09-29 ASSESSMENT — PAIN DESCRIPTION - LOCATION: LOCATION: RIB CAGE;CHEST

## 2022-09-29 ASSESSMENT — ENCOUNTER SYMPTOMS
NAUSEA: 1
VOMITING: 0
COLOR CHANGE: 1
ABDOMINAL PAIN: 0
DIARRHEA: 0
BACK PAIN: 0
SHORTNESS OF BREATH: 0

## 2022-09-29 ASSESSMENT — PAIN DESCRIPTION - FREQUENCY: FREQUENCY: INTERMITTENT

## 2022-09-29 ASSESSMENT — PAIN SCALES - GENERAL
PAINLEVEL_OUTOF10: 8
PAINLEVEL_OUTOF10: 8

## 2022-09-29 ASSESSMENT — PAIN DESCRIPTION - DESCRIPTORS: DESCRIPTORS: SHARP;SHOOTING

## 2022-09-29 ASSESSMENT — PAIN DESCRIPTION - ORIENTATION: ORIENTATION: UPPER

## 2022-09-29 ASSESSMENT — PAIN - FUNCTIONAL ASSESSMENT: PAIN_FUNCTIONAL_ASSESSMENT: 0-10

## 2022-09-29 NOTE — Clinical Note
Jody English was seen and treated in our emergency department on 9/29/2022. He may return to work on 10/02/2022. If you have any questions or concerns, please don't hesitate to call.       Reece Castillo

## 2022-09-30 NOTE — ED PROVIDER NOTES
[facility] EMERGENCY DEPT  eMERGENCY dEPARTMENT eNCOUnter      Pt Name: Cornelia Paget  MRN: [id number]  [place] 1970  Date of evaluation: 9/29/2022  Provider: Mounika Ferrer, [facility]       Chief Complaint   Patient presents with    Fall     Pt reports drinking beer last night. Woke up on middle of the night to pee. Pt states he fell, doesn't remember falling but woke in floor. Pt is tearful upon triage complaining of right chest pain. There is red petechia to right chest.         HISTORY OF PRESENT ILLNESS   (Location/Symptom, Timing/Onset,Context/Setting, Quality, Duration, Modifying Factors, Severity)  Note limiting factors. Cornelia Paget is a 47 y.o. male with history including hyperlipidemia who presents to the emergency department with complaint of a fall. The patient states he drinks around 8 beers per evening. He drink his beer last night and went to bed like normal.  He woke up around 2:30 AM to go to the bathroom. He states whenever he was urinating t he felt like he was about to pass out. He states he woke up on the floor. He is unsure how long he was on the floor. He went back to bed like normal.  This morning he woke up and his continued severe headache and feels like his \"equilibrium is off. \" He notes nausea without vomiting. He also complains of right chest pain. He states he has bruising on the right side of the chest.  He also notes pain in the right flank. He is still urinating like normal.  He denies any difficulty or issues with eating and drinking. He also denies any difficulty walking. He denies any back or extremity pain. NursingNotes were reviewed. REVIEW OF SYSTEMS    (2-9 systems for level 4, 10 or more for level 5)     Review of Systems   Constitutional:  Negative for chills and fever. Respiratory:  Negative for shortness of breath. Cardiovascular:  Positive for chest pain (Right chest wall with bruising). Gastrointestinal:  Positive for nausea. Negative for abdominal pain, diarrhea and vomiting. Musculoskeletal:  Positive for neck pain. Negative for arthralgias and back pain. Skin:  Positive for color change. Neurological:  Positive for syncope and headaches. All other systems reviewed and are negative. PAST MEDICALHISTORY     Past Medical History:   Diagnosis Date    Inguinal hernia bilateral, non-recurrent     Non-recurrent bilateral inguinal hernia without obstruction or gangrene 6/10/2020         SURGICAL HISTORY       Past Surgical History:   Procedure Laterality Date    DENTAL SURGERY      all upper teeth removed    HERNIA REPAIR Bilateral 6/10/2020    LAPAROSCOPIC BILATERAL INGUINAL HERNIA REPAIR WITH MESH performed by Delfino Quan MD at [address] [place] [address]     Discharge Medication List as of 9/29/2022 10:26 PM        CONTINUE these medications which have NOT CHANGED    Details   ibuprofen (ADVIL;MOTRIN) 200 MG tablet Take 200 mg by mouth every 6 hours as needed for PainHistorical Med             ALLERGIES     Patient has no known allergies. FAMILY HISTORY       Family History   Adopted: Yes   Problem Relation Age of Onset    Diabetes Brother           SOCIAL HISTORY       Social History     Socioeconomic History    Marital status:    Tobacco Use    Smoking status: Every Day     Packs/day: 1.00     Years: 32.00     Pack years: 32.00     Types: Cigarettes     Start date: 1989    Smokeless tobacco: Never    Tobacco comments:     Work at [employer] Use: Never used   Substance and Sexual Activity    Alcohol use:  Yes     Alcohol/week: 6.0 standard drinks     Types: 6 Cans of beer per week     Comment: 6 pack per night    Drug use: No       SCREENINGS    Tracee Coma Scale  Eye Opening: Spontaneous  Best Verbal Response: Oriented  Best Motor Response: Obeys commands  [place] Coma Scale Score: 15        PHYSICAL EXAM    (up to 7 for level 4, 8 or more for level 5)     ED Triage Vitals [09/29/22 1538]   BP Temp Temp Source Heart Rate Resp SpO2 Height Weight   (!) 137/123 97.9 °F (36.6 °C) Oral 66 16 97 % -- --       Physical Exam  Vitals and nursing note reviewed. Constitutional:       General: He is not in acute distress. Appearance: Normal appearance. He is normal weight. He is not ill-appearing, toxic-appearing or diaphoretic. HENT:      Head: Normocephalic. Comments: Bruising on the right temple, no open lacerations, no facial deformity  Eyes:      Extraocular Movements: Extraocular movements intact. Conjunctiva/sclera: Conjunctivae normal.      Pupils: Pupils are equal, round, and reactive to light. Cardiovascular:      Rate and Rhythm: Normal rate and regular rhythm. Pulses: Normal pulses. Pulmonary:      Effort: Pulmonary effort is normal. No respiratory distress. Comments: Petechia over the right chest, anterior right chest tenderness, no obvious deformity  Chest:      Chest wall: Tenderness present. Abdominal:      General: There is no distension. Palpations: Abdomen is soft. Tenderness: There is abdominal tenderness (Tenderness over the right flank, no associated bruising). Musculoskeletal:      Cervical back: Normal range of motion and neck supple. Tenderness and bony tenderness present. No swelling or deformity. Pain with movement present. Normal range of motion. Thoracic back: No swelling, deformity, tenderness or bony tenderness. Normal range of motion. Lumbar back: No swelling, deformity, tenderness or bony tenderness. Normal range of motion. Comments: Bilateral upper and lower extremities are negative for tenderness, swelling, deformity, or decreased range of motion. Neurovascularly intact extremities   Skin:     General: Skin is warm and dry. Neurological:      General: No focal deficit present. Mental Status: He is alert and oriented to person, place, and time.        DIAGNOSTIC RESULTS     EKG: All EKG's areinterpreted by the Emergency Department Physician who either signs or Co-signs this chart in the absence of a cardiologist.    EKG interpreted by attending, sinus rhythm at a rate of 69, no STEMI or acute ischemia, , QTc 422    RADIOLOGY:  Non-plain film images such as CT, Ultrasound and MRI are read by the radiologist. Plain radiographic images are visualized and preliminarily interpreted bythe emergency physician with the below findings:      CT ABDOMEN PELVIS W IV CONTRAST Additional Contrast? None   Final Result   1. No acute intra-abdominal or intrapelvic injuries appreciated, with findings, as above. Recommendation: Follow up as clinically indicated. All CT scans at this facility utilize dose modulation, iterative reconstruction, and/or weight based dosing when appropriate to reduce radiation dose to as low as reasonably achievable. Electronically Signed by Quynh Campbell MD at 29-Sep-2022 11:14:24 PM               CT CHEST W CONTRAST   Final Result   1. No acute pathology. 2. Mild para-septal emphysematous changes seen in both lungs. 3. Tiny nodule in the lateral segment of the left lower lobe, possibly fibrotic. Recommendation:   Follow up as clinically indicated. All CT scans at this facility utilize dose modulation, iterative reconstruction, and/or weight based dosing when appropriate to reduce radiation dose to as low as reasonably achievable. Electronically Signed by Robb Hanson MD at 29-Sep-2022 10:46:30 PM               CT CERVICAL SPINE WO CONTRAST   Final Result   1. No acute fracture or subluxation. 2.  Mild reversal of cervical lordosis likely to be due to muscle spasm. 3.  Mild degenerative changes in cervical spine at C5-C6 and C6-C7 levels. Recommendation: Follow up as clinically indicated.     All CT scans at this facility utilize dose modulation, iterative reconstruction, and/or weight based dosing when appropriate to reduce radiation dose to as low as reasonably achievable. Electronically Signed by Jing Atkinson MD at 29-Sep-2022 10:53:09 PM               CT HEAD WO CONTRAST   Final Result   1. No acute intracranial process appreciated, with findings as above. Recommendation: Follow up as clinically indicated. All CT scans at this facility utilize dose modulation, iterative reconstruction, and/or weight based dosing when appropriate to reduce radiation dose to as low as reasonably achievable. Electronically Signed by Wali Andrew MD at 29-Sep-2022 11:16:50 PM                       LABS:  Labs Reviewed   COMPREHENSIVE METABOLIC PANEL - Abnormal; Notable for the following components:       Result Value    Total Bilirubin 1.6 (*)     All other components within normal limits   CBC WITH AUTO DIFFERENTIAL - Abnormal; Notable for the following components:    MCV 95.4 (*)     MCH 31.7 (*)     MPV 9.2 (*)     All other components within normal limits   PROTIME-INR   APTT   TROPONIN       All other labs were within normal range or not returned as of this dictation. EMERGENCY DEPARTMENT COURSE and DIFFERENTIAL DIAGNOSIS/MDM:   Vitals:    Vitals:    09/29/22 1538 09/29/22 1934   BP: (!) 137/123 (!) 156/92   Pulse: 66 58   Resp: 16 20   Temp: 97.9 °F (36.6 °C)    TempSrc: Oral    SpO2: 97% 97%       MDM  Patient is a 60-year-old male presents ER with complaint of a syncopal episode that occurred last night with associated right-sided chest pain, headache, and neck pain. On physical exam, he does have signs of trauma including bruising on the right temple as well as bruising on the right anterior chest wall. Regarding the patient syncopal episode, cardiac work-up was obtained. EKG was negative. He was on the monitor while in the ER and there was no concerning findings for arrhythmia noted. His troponin was also negative so I have no suspicion for NSTEMI at this time. As its been over 6 hours since this occurred, will not continue trending.   His CBC does not show leukocytosis or anemia. CMP is negative for electrode disturbance, LEONIE, or LFT elevation. His coags are stable. Due to his trauma, CT of the head, C-spine, chest, and abdomen were obtained. These were read as negative for acute signs of trauma including skull fracture, brain bleed, cervical fracture, or rib fracture. Radiologist does not see signs of trauma to the lungs including pneumothorax or pleural effusion. I have gone over symptom control outpatient we will add muscle relaxer for pain control. I have stressed that the patient needs to follow-up with his primary care in the next 2 to 3 days to ensure improvement and for further evaluation of his syncope as needed. Return precautions were given to the patient who verbalized understanding. All his questions were answered. FINAL IMPRESSION      1. Syncope and collapse    2. Chest wall pain    3. Closed head injury, initial encounter    4.  Strain of neck muscle, initial encounter          DISPOSITION/PLAN   DISPOSITION Decision To Discharge 09/29/2022 10:24:00 PM      PATIENT REFERRED TO:  El Reveles DO  [address] [facility] [address]  [phone]    Go in 2 days      DISCHARGE MEDICATIONS:  Discharge Medication List as of 9/29/2022 10:26 PM        START taking these medications    Details   methocarbamol (ROBAXIN) 500 MG tablet Take 1 tablet by mouth 3 times daily for 5 days, Disp-15 tablet, R-0Normal                (Please note that portions of this note were completed with a voice recognition program.  Efforts were made to edit thedictations but occasionally words are mis-transcribed.)    SRIRAM Egan (electronically signed)        Reece Egan  09/29/22 3315

## 2022-09-30 NOTE — DISCHARGE INSTRUCTIONS
Follow-up with your primary care provider with resources back in the next 2 to 3 days to ensure improvement.   Return to the ER for new or worsening symptoms

## 2022-09-30 NOTE — ED NOTES
Patient placed on cardiac monitor, continuous pulse oximeter, and NIBP monitor. Monitor alarms on.          Aysha Dorman RN  09/29/22 6858

## 2022-10-01 LAB
EKG P AXIS: 69 DEGREES
EKG P-R INTERVAL: 142 MS
EKG Q-T INTERVAL: 394 MS
EKG QRS DURATION: 103 MS
EKG QTC CALCULATION (BAZETT): 423 MS
EKG T AXIS: 45 DEGREES

## 2022-10-01 PROCEDURE — 93010 ELECTROCARDIOGRAM REPORT: CPT | Performed by: INTERNAL MEDICINE

## 2022-10-13 ENCOUNTER — CLINICAL DOCUMENTATION (OUTPATIENT)
Dept: PRIMARY CARE CLINIC | Age: 52
End: 2022-10-13

## 2022-10-14 ENCOUNTER — TELEPHONE (OUTPATIENT)
Dept: PRIMARY CARE CLINIC | Age: 52
End: 2022-10-14

## 2022-10-14 ASSESSMENT — PATIENT HEALTH QUESTIONNAIRE - PHQ9
SUM OF ALL RESPONSES TO PHQ QUESTIONS 1-9: 0
1. LITTLE INTEREST OR PLEASURE IN DOING THINGS: 0
SUM OF ALL RESPONSES TO PHQ QUESTIONS 1-9: 0

## 2022-10-31 ENCOUNTER — OFFICE VISIT (OUTPATIENT)
Dept: PRIMARY CARE CLINIC | Age: 52
End: 2022-10-31
Payer: MEDICAID

## 2022-10-31 VITALS
HEART RATE: 93 BPM | OXYGEN SATURATION: 96 % | HEIGHT: 69 IN | BODY MASS INDEX: 24.37 KG/M2 | DIASTOLIC BLOOD PRESSURE: 84 MMHG | WEIGHT: 164.5 LBS | SYSTOLIC BLOOD PRESSURE: 138 MMHG | TEMPERATURE: 97.3 F

## 2022-10-31 DIAGNOSIS — J06.9 VIRAL URI: Primary | ICD-10-CM

## 2022-10-31 DIAGNOSIS — Z11.52 ENCOUNTER FOR SCREENING FOR COVID-19: ICD-10-CM

## 2022-10-31 DIAGNOSIS — H66.002 NON-RECURRENT ACUTE SUPPURATIVE OTITIS MEDIA OF LEFT EAR WITHOUT SPONTANEOUS RUPTURE OF TYMPANIC MEMBRANE: ICD-10-CM

## 2022-10-31 DIAGNOSIS — R50.9 FEVER, UNSPECIFIED FEVER CAUSE: ICD-10-CM

## 2022-10-31 LAB
INFLUENZA A ANTIBODY: NEGATIVE
INFLUENZA B ANTIBODY: NEGATIVE
SARS-COV-2, PCR: DETECTED

## 2022-10-31 PROCEDURE — 99213 OFFICE O/P EST LOW 20 MIN: CPT | Performed by: NURSE PRACTITIONER

## 2022-10-31 PROCEDURE — 87804 INFLUENZA ASSAY W/OPTIC: CPT | Performed by: NURSE PRACTITIONER

## 2022-10-31 RX ORDER — CEFDINIR 300 MG/1
300 CAPSULE ORAL 2 TIMES DAILY
Qty: 20 CAPSULE | Refills: 0 | Status: SHIPPED | OUTPATIENT
Start: 2022-10-31 | End: 2022-11-10

## 2022-10-31 ASSESSMENT — ENCOUNTER SYMPTOMS
BLOOD IN STOOL: 0
NAUSEA: 0
CONSTIPATION: 0
SORE THROAT: 1
SHORTNESS OF BREATH: 0
EYE DISCHARGE: 0
ABDOMINAL PAIN: 0
COLOR CHANGE: 0
EYE ITCHING: 0
WHEEZING: 0
SINUS PRESSURE: 0
VOMITING: 0
DIARRHEA: 0
RHINORRHEA: 0
COUGH: 1

## 2022-10-31 NOTE — LETTER
[facility] ([facility]) [facility]  [address] [place] [address]  Phone: [phone]  Fax: [phone]    HAN Baxter CNP        October 31, 2022     Patient: Randee Morris   YOB: 1970   Date of Visit: 10/31/2022       To Whom it May Concern:    Randee Morris was seen in my clinic on 10/31/2022. He may return to school on 11/02/2022, with a negative covid test result. .    If you have any questions or concerns, please don't hesitate to call.     Sincerely,         HAN Baxter CNP

## 2022-10-31 NOTE — LETTER
[facility] ([facility]) [facility] [address]  Phone: [phone]  Fax: [phone]    HAN Beal CNP        October 31, 2022     Patient: Pavel Perla   YOB: 1970   Date of Visit: 10/31/2022       To Whom it May Concern:    Pavel Perla was seen in my clinic on 10/31/2022. He may return to work on 11/02/2022 with a negative covid result. If you have any questions or concerns, please don't hesitate to call.     Sincerely,         HAN Beal CNP

## 2022-10-31 NOTE — PATIENT INSTRUCTIONS
COVID-19 Information    What are the symptoms of COVID-19? -- Symptoms usually start 4 or 5 days after a person is infected with the virus. But in some people, it can take up to 2 weeks for symptoms to appear. Some people never show symptoms at all. When symptoms do happen, they can include:  ?Fever  ? Cough  ? Trouble breathing  ? Feeling tired  ? Shaking chills  ? Muscle aches  ? Headache  ? Sore throat  ? Runny or stuffy nose  ? Problems with sense of smell or taste    Some people have digestive problems like nausea or diarrhea. There have also been some reports of rashes or other skin symptoms. For example, some people with COVID-19 get reddish-purple spots on their fingers or toes. But it's not clear why or how often this happens. For most people, symptoms will get better within a few days to weeks. But a small number of people get very sick and stop being able to breathe on their own. In severe cases, their organs stop working, which can lead to death. Some people with COVID-19 continue to have some symptoms for weeks or months. This seems to be more likely in people who are sick enough to need to stay in the hospital. But this can also happen in people who did not get very sick. Doctors are still learning about the long-term effects of COVID-19. While children can get COVID-19, they are less likely than adults to have severe symptoms. How is COVID-19 spread? -- The virus that causes COVID-19 mainly spreads from person to person. This usually happens when an infected person coughs, sneezes, or talks near other people. The virus is passed through tiny particles from the infected person's lungs and airway. These particles can easily travel through the air to other people who are nearby. In some cases, like in indoor spaces where the same air keeps being blown around, virus in the particles might be able to spread to other people who are farther away.  The virus can be passed easily between people who live together. But it can also spread at gatherings where people are talking close together, shaking hands, hugging, sharing food, or even singing together. Eating at restaurants raises the risk of infection, since people tend to be close to each other and not covering their faces. Doctors also think it is possible to get infected if you touch a surface that has the virus on it and then touch your mouth, nose, or eyes. However, this is probably not very common. A person can be infected, and spread the virus to others, even without having any symptoms. Can COVID-19 be prevented? -- The best way to prevent COVID-19 is to get vaccinated. People age 11 and older can get a vaccine. People age 15 and older should also get a \"booster\" shot to give them extra protection. Experts believe that vaccines are one of the most important ways to control the COVID-19 pandemic. People who are fully vaccinated are at much lower risk of getting sick from the virus. If you are not yet vaccinated, there are other ways to help protect yourself and others:  ? Practice \"social distancing. \" It's most important to avoid contact with people who are sick. But social distancing also means staying at least 6 feet (about 2 meters) from anyone outside your household. That's because the virus can spread easily through close contact, and it's not always possible to know who is infected. ?Wear a face mask when you need to go be in public around other people. It might also help protect you from others who could be infected. Make sure your mask covers your mouth and nose. ? Wash your hands with soap and water often. This is especially important after being out in public or touching surfaces that many other people also touch, like door handles or railings. The risk of getting infected by touching items like this is probably not very high. Still, it's a good idea to wash your hands often.  This also helps protect you from other illnesses, like the flu or the common cold. ? Avoid touching your face, especially your mouth, nose, and eyes. What if I feel fine but think I was exposed? -- If you think you were in close contact with someone with COVID-19, what to do next depends on whether you are vaccinated. It also depends on how long ago you got the vaccine and whether you have had a booster shot. Although people who are fully vaccinated are less likely to get sick and infect others, it can still happen. This is why it's important to take steps to lower this risk. First, think about these questions:  ?Have you gotten a booster shot? ?Have you had 2 doses of the Pfizer or Moderna vaccine within the last 6 months? ?Have you had the Edmonia Crow and Edmonia Crow vaccine within the last 2 months? If you answered \"yes\" to any of the above questions, experts recommend doing the following after being exposed to someone with COVID-19:  ?You do not need to self-quarantine. But you should wear a mask around all other people for 10 days. ?If possible, get tested 5 days after the exposure: If your test is negative, continue to wear a mask around other people until 10 total days have passed  If your test is positive, stay home and \"self-isolate\" for at least 5 days  ? If you start to have symptoms at any point, stay home and get tested again    If you have not been vaccinated at all, or if you answered \"no\" to all of the above questions, experts recommend doing the following:  ?Self-quarantine for 5 days after the exposure. This means staying home and away from other people as much as possible. If you need to be around people, like in your home, wear a mask. ?If possible, get tested 5 days after the exposure: If your test is negative, continue to wear a mask around other people until 10 total days have passed  If your test is positive, continue to stay home and \"self-isolate\" for at least another 5 days  ? If you start to have symptoms at any point, stay home and get tested again  The guidance around what to do after being exposed has changed over time. That's because experts have learned more about the virus and when a person is most likely to infect others. If you are not sure whether you need to self-quarantine, or when you can go back to your normal activities, ask your doctor or nurse. What should I do if someone in my home has COVID-19? -- If someone in your home has COVID-19, there are additional things you can do to protect yourself and others:  ?Keep the sick person away from others - The sick person should stay in a separate room, and use a different bathroom if possible. They should also eat in their own room. Experts also recommend that the person stay away from pets in the house until they are better. ? Have them wear a mask - The sick person should wear a mask when they are in the same room as other people. If they can't wear a mask, you can help protect yourself by covering your face when you are in the room with them. ?Wash hands [employer] your hands with soap and water often. ? Clean often - Here are some specific things that can help:  Wear disposable gloves when you clean. It's also a good idea to wear gloves when you have to touch the sick person's laundry, dishes, utensils, or trash. Wash your hands after removing your gloves. Regularly clean things that are touched a lot. This includes counters, bedside tables, doorknobs, computers, phones, and bathroom surfaces. Clean things in your home with soap and water, but also use disinfectants on appropriate surfaces. Some cleaning products work well to kill bacteria, but not viruses, so it's important to check labels. How is COVID-19 treated? -- Many people will be able to stay home while they get better.  But people with serious symptoms or other health problems might need to go to the hospital.  ?Mild illness - Mild illness means you might have symptoms like fever and cough, but you do not have trouble breathing. Most people with COVID-19 have mild illness and can rest at home until they get better. This usually takes about 2 weeks, but it's not the same for everyone. If you are recovering from COVID-19, it's important to stay home and \"self-isolate\" while you are most likely to spread the virus. Self-isolation means staying apart from other people, even the people you live with. When you can stop self-isolation will depend on how long it has been since you had symptoms. If you are generally healthy and your symptoms are improving (or you don't have symptoms), experts recommend self-isolating for at least 5 days. The 5 days starts the day after you develop symptoms or get tested. After this, you should wear a mask around all other people for 5 more days. If you are not sure how long to self-isolate, or if you still have symptoms after 5 days, talk to your doctor or nurse. You should also check with your doctor or nurse if you have a weakened immune system. If you are at risk for getting seriously ill, doctors might recommend treatment even if you only have mild symptoms. This can lower your risk of getting sicker. Options might include pills that you take for a few days, a treatment called \"monoclonal antibodies\" that is given through an IV or as a shot, and another medicine that is given by IV. Doctors also might recommend being part of a clinical trial. This is a scientific study that tests new medicines to see how well they work. Do not try any new medicines or treatments without talking to a doctor. ? Severe illness - If you have more severe illness with trouble breathing, you might need to stay in the hospital, possibly in the intensive care unit (also called the \"ICU\"). While you are there, you will most likely be in a special isolation room. Only medical staff will be allowed in the room, and they will have to wear special gowns, gloves, masks, and eye protection.   The doctors and nurses can monitor and support your breathing and other body functions and make you as comfortable as possible. You might need extra oxygen to help you breathe easily. If you are having a very hard time breathing, you might need a breathing tube. The tube goes down your throat and into your lungs. It is connected to a machine to help you breathe, called a \"ventilator. \" You might also get medicines that have been shown to help some people with severe COVID-19. COVID Recommendations  Medications such as mucinex, flonase, and claritin may help with symptoms. Use tylenol/motrin as needed for body aches/fevers  To boost your immune system, it is recommended to take Vitamin B, C, D, and zinc.  Zinc  mg daily for 5 days and then decrease to 50 mg a day. Sometimes higher doses may cause nausea. Vitamin C 1000 mg a day  Vitamin D 20 - 25 mcg a day  Take Aspirin 81 mg unless you have an allergy to aspirin or you have stomach issues/bleeding disorders. Delsym is a great OTC cough suppressant. Drink plenty of water to stay hydrated. Get adequate rest. Try lying on your stomach intermittently to help you breathe easier. Get up and move around when you can. Perform deep breathing exercises  Check your oxygen saturation regularly and if below 90%, go to the ER. Go to the ER if you have severe SOA, chest pain, difficulty breathing, or you can not keep your fever below 102F with medications.

## 2022-10-31 NOTE — PROGRESS NOTES
[address] [facility] [address] [facility] [address]  Dept: [phone]  Dept Fax: [phone]  Loc: [phone]    Susan Hashimoto is a 47 y.o. male who presents today for his medical conditions/complaints as noted below. Susan Hashimoto is complaining of Dizziness (Symptoms started Saturday), Generalized Body Aches (sore), Pharyngitis, and Headache        HPI:   Pharyngitis  This is a new problem. The current episode started in the past 7 days (2 days ago). The problem occurs constantly. The problem has been waxing and waning. Associated symptoms include congestion, coughing, fatigue, headaches, myalgias and a sore throat. Pertinent negatives include no abdominal pain, chest pain, chills, fever, nausea, rash or vomiting. The symptoms are aggravated by swallowing. He has tried acetaminophen for the symptoms. The treatment provided mild relief. No known COVID, sore throat, or flu exposure recently. Past Medical History:   Diagnosis Date    Inguinal hernia bilateral, non-recurrent     Non-recurrent bilateral inguinal hernia without obstruction or gangrene 6/10/2020       Past Surgical History:   Procedure Laterality Date    DENTAL SURGERY      all upper teeth removed    HERNIA REPAIR Bilateral 6/10/2020    LAPAROSCOPIC BILATERAL INGUINAL HERNIA REPAIR WITH MESH performed by Adrienne Bautista MD at [facility] History   Adopted: Yes   Problem Relation Age of Onset    Diabetes Brother        Social History     Tobacco Use    Smoking status: Every Day     Packs/day: 1.00     Years: 32.00     Pack years: 32.00     Types: Cigarettes     Start date: 1989    Smokeless tobacco: Never    Tobacco comments:     Work at [employer] Topics    Alcohol use:  Yes     Alcohol/week: 6.0 standard drinks     Types: 6 Cans of beer per week     Comment: 6 pack per night        Current Outpatient Medications   Medication Sig Dispense Refill cefdinir (OMNICEF) 300 MG capsule Take 1 capsule by mouth 2 times daily for 10 days 20 capsule 0    ibuprofen (ADVIL;MOTRIN) 200 MG tablet Take 200 mg by mouth every 6 hours as needed for Pain       No current facility-administered medications for this visit. No Known Allergies    Health Maintenance   Topic Date Due    COVID-19 Vaccine (1) Never done    Pneumococcal 0-64 years Vaccine (1 - PCV) Never done    DTaP/Tdap/Td vaccine (1 - Tdap) Never done    Colorectal Cancer Screen  Never done    Shingles vaccine (1 of 2) Never done    Low dose CT lung screening  Never done    Flu vaccine (1) Never done    Depression Screen  10/14/2023    Lipids  02/04/2026    Hepatitis C screen  Completed    HIV screen  Completed    Hepatitis A vaccine  Aged Out    Hib vaccine  Aged Out    Meningococcal (ACWY) vaccine  Aged Out       Subjective:   Review of Systems   Constitutional:  Positive for fatigue. Negative for activity change, appetite change, chills and fever. HENT:  Positive for congestion and sore throat. Negative for ear pain, rhinorrhea and sinus pressure. Eyes:  Negative for discharge and itching. Respiratory:  Positive for cough. Negative for shortness of breath and wheezing. Cardiovascular:  Negative for chest pain. Gastrointestinal:  Negative for abdominal pain, blood in stool, constipation, diarrhea, nausea and vomiting. Musculoskeletal:  Positive for myalgias. Skin:  Negative for color change and rash. Neurological:  Positive for dizziness and headaches. All other systems reviewed and are negative. Objective    Physical Exam  Vitals and nursing note reviewed. Constitutional:       General: He is not in acute distress. Appearance: Normal appearance. HENT:      Head: Normocephalic and atraumatic. Right Ear: Ear canal normal. A middle ear effusion is present. Left Ear: Ear canal normal. A middle ear effusion is present. Tympanic membrane is erythematous.       Mouth/Throat: Mouth: Mucous membranes are moist.      Pharynx: No posterior oropharyngeal erythema. Comments: Post nasal drip noted    Eyes:      Extraocular Movements: Extraocular movements intact. Pupils: Pupils are equal, round, and reactive to light. Cardiovascular:      Rate and Rhythm: Normal rate and regular rhythm. Pulses: Normal pulses. Heart sounds: Normal heart sounds. No murmur heard. Pulmonary:      Effort: Pulmonary effort is normal. No respiratory distress. Breath sounds: Normal breath sounds. No wheezing. Skin:     General: Skin is warm. Capillary Refill: Capillary refill takes less than 2 seconds. Coloration: Skin is not pale. Findings: No rash. Neurological:      General: No focal deficit present. Mental Status: He is alert and oriented to person, place, and time. Deep Tendon Reflexes: Reflexes are normal and symmetric. Psychiatric:         Attention and Perception: Attention normal.         Mood and Affect: Mood normal.         Behavior: Behavior normal. Behavior is cooperative. Thought Content: Thought content normal.       /84   Pulse 93   Temp 97.3 °F (36.3 °C)   Ht 5' 9\" (1.753 m)   Wt 164 lb 8 oz (74.6 kg)   SpO2 96%   BMI 24.29 kg/m²     Assessment         Diagnosis Orders   1. Viral URI        2. Non-recurrent acute suppurative otitis media of left ear without spontaneous rupture of tympanic membrane  cefdinir (OMNICEF) 300 MG capsule      3. Fever, unspecified fever cause  POCT Influenza A/B      4. Encounter for screening for COVID-19  COVID-19          Plan   COVID testing today; will call with results  Quarantine guidelines discussed  Take full course of antibiotics. Medications such as zyrtec or claritin may help with symptoms. Also use OTC cough medicine like delsym if applicable.    Use tylenol/motrin as needed for body aches/fevers unless contraindicated  Multivitamin is recommended to help boost the immune system  Drink plenty of water to stay hydrated. May use humidifier as needed. Allow adequate rest.  Encourage deep breathing exercises  Go to the ER if you develop severe SOA, chest pain, difficulty breathing, decreased urine output or signs of dehydration, severe abdominal pain or vomiting, or you can not keep the fever below 102F with medications. Orders Placed This Encounter   Procedures    COVID-19     Scheduling Instructions:      1) Due to current limited availability of the COVID-19 test, tests will be prioritized based on responses to questions above. Testing may be delayed due to volume. 2) Print and instruct patient to adhere to CDC home isolation program. (Link Above)              3) Set up or refer patient for a monitoring program.              4) Have patient sign up for and leverage MyChart (if not previously done). Order Specific Question:   Is this test for diagnosis or screening? Answer:   Screening     Order Specific Question:   Symptomatic for COVID-19 as defined by CDC? Answer:   No     Order Specific Question:   Date of Symptom Onset     Answer:   N/A     Order Specific Question:   Hospitalized for COVID-19? Answer:   No     Order Specific Question:   Admitted to ICU for COVID-19? Answer:   No     Order Specific Question:   Employed in healthcare setting? Answer:   Unknown     Order Specific Question:   Resident in a congregate (group) care setting? Answer:   Unknown     Order Specific Question:   Pregnant: Answer:   No     Order Specific Question:   Previously tested for COVID-19?      Answer:   Yes    POCT Influenza A/B       Results for orders placed or performed in visit on 10/31/22   POCT Influenza A/B   Result Value Ref Range    Influenza A Ab negative     Influenza B Ab negative        Orders Placed This Encounter   Medications    cefdinir (OMNICEF) 300 MG capsule     Sig: Take 1 capsule by mouth 2 times daily for 10 days     Dispense:  20 capsule     Refill:  0        New Prescriptions    CEFDINIR (OMNICEF) 300 MG CAPSULE    Take 1 capsule by mouth 2 times daily for 10 days        Return if symptoms worsen or fail to improve. Discussed use, benefits, and side effects of any prescribed medications. All patient questions were answered. Patient voiced understanding of care plan. Patient was given educational materials - see patient instructions below. Patient Instructions   COVID-19 Information    What are the symptoms of COVID-19? -- Symptoms usually start 4 or 5 days after a person is infected with the virus. But in some people, it can take up to 2 weeks for symptoms to appear. Some people never show symptoms at all. When symptoms do happen, they can include:  ?Fever  ? Cough  ? Trouble breathing  ? Feeling tired  ? Shaking chills  ? Muscle aches  ? Headache  ? Sore throat  ? Runny or stuffy nose  ? Problems with sense of smell or taste    Some people have digestive problems like nausea or diarrhea. There have also been some reports of rashes or other skin symptoms. For example, some people with COVID-19 get reddish-purple spots on their fingers or toes. But it's not clear why or how often this happens. For most people, symptoms will get better within a few days to weeks. But a small number of people get very sick and stop being able to breathe on their own. In severe cases, their organs stop working, which can lead to death. Some people with COVID-19 continue to have some symptoms for weeks or months. This seems to be more likely in people who are sick enough to need to stay in the hospital. But this can also happen in people who did not get very sick. Doctors are still learning about the long-term effects of COVID-19. While children can get COVID-19, they are less likely than adults to have severe symptoms. How is COVID-19 spread? -- The virus that causes COVID-19 mainly spreads from person to person.  This usually happens when an infected person coughs, sneezes, or talks near other people. The virus is passed through tiny particles from the infected person's lungs and airway. These particles can easily travel through the air to other people who are nearby. In some cases, like in indoor spaces where the same air keeps being blown around, virus in the particles might be able to spread to other people who are farther away. The virus can be passed easily between people who live together. But it can also spread at gatherings where people are talking close together, shaking hands, hugging, sharing food, or even singing together. Eating at restaurants raises the risk of infection, since people tend to be close to each other and not covering their faces. Doctors also think it is possible to get infected if you touch a surface that has the virus on it and then touch your mouth, nose, or eyes. However, this is probably not very common. A person can be infected, and spread the virus to others, even without having any symptoms. Can COVID-19 be prevented? -- The best way to prevent COVID-19 is to get vaccinated. People age 11 and older can get a vaccine. People age 15 and older should also get a \"booster\" shot to give them extra protection. Experts believe that vaccines are one of the most important ways to control the COVID-19 pandemic. People who are fully vaccinated are at much lower risk of getting sick from the virus. If you are not yet vaccinated, there are other ways to help protect yourself and others:  ? Practice \"social distancing. \" It's most important to avoid contact with people who are sick. But social distancing also means staying at least 6 feet (about 2 meters) from anyone outside your household. That's because the virus can spread easily through close contact, and it's not always possible to know who is infected. ?Wear a face mask when you need to go be in public around other people.  It might also help protect you from others who could be infected. Make sure your mask covers your mouth and nose. ? Wash your hands with soap and water often. This is especially important after being out in public or touching surfaces that many other people also touch, like door handles or railings. The risk of getting infected by touching items like this is probably not very high. Still, it's a good idea to wash your hands often. This also helps protect you from other illnesses, like the flu or the common cold. ? Avoid touching your face, especially your mouth, nose, and eyes. What if I feel fine but think I was exposed? -- If you think you were in close contact with someone with COVID-19, what to do next depends on whether you are vaccinated. It also depends on how long ago you got the vaccine and whether you have had a booster shot. Although people who are fully vaccinated are less likely to get sick and infect others, it can still happen. This is why it's important to take steps to lower this risk. First, think about these questions:  ?Have you gotten a booster shot? ?Have you had 2 doses of the Pfizer or Moderna vaccine within the last 6 months? ?Have you had the Nory Abena and Nory Abena vaccine within the last 2 months? If you answered \"yes\" to any of the above questions, experts recommend doing the following after being exposed to someone with COVID-19:  ?You do not need to self-quarantine. But you should wear a mask around all other people for 10 days. ?If possible, get tested 5 days after the exposure: If your test is negative, continue to wear a mask around other people until 10 total days have passed  If your test is positive, stay home and \"self-isolate\" for at least 5 days  ? If you start to have symptoms at any point, stay home and get tested again    If you have not been vaccinated at all, or if you answered \"no\" to all of the above questions, experts recommend doing the following:  ?Self-quarantine for 5 days after the exposure.  This means staying home and away from other people as much as possible. If you need to be around people, like in your home, wear a mask. ?If possible, get tested 5 days after the exposure: If your test is negative, continue to wear a mask around other people until 10 total days have passed  If your test is positive, continue to stay home and \"self-isolate\" for at least another 5 days  ? If you start to have symptoms at any point, stay home and get tested again  The guidance around what to do after being exposed has changed over time. That's because experts have learned more about the virus and when a person is most likely to infect others. If you are not sure whether you need to self-quarantine, or when you can go back to your normal activities, ask your doctor or nurse. What should I do if someone in my home has COVID-19? -- If someone in your home has COVID-19, there are additional things you can do to protect yourself and others:  ?Keep the sick person away from others - The sick person should stay in a separate room, and use a different bathroom if possible. They should also eat in their own room. Experts also recommend that the person stay away from pets in the house until they are better. ? Have them wear a mask - The sick person should wear a mask when they are in the same room as other people. If they can't wear a mask, you can help protect yourself by covering your face when you are in the room with them. ?Wash hands [employer] your hands with soap and water often. ? Clean often - Here are some specific things that can help:  Wear disposable gloves when you clean. It's also a good idea to wear gloves when you have to touch the sick person's laundry, dishes, utensils, or trash. Wash your hands after removing your gloves. Regularly clean things that are touched a lot. This includes counters, bedside tables, doorknobs, computers, phones, and bathroom surfaces.   Clean things in your home with soap and water, but also use disinfectants on appropriate surfaces. Some cleaning products work well to kill bacteria, but not viruses, so it's important to check labels. How is COVID-19 treated? -- Many people will be able to stay home while they get better. But people with serious symptoms or other health problems might need to go to the hospital.  ?Mild illness - Mild illness means you might have symptoms like fever and cough, but you do not have trouble breathing. Most people with COVID-19 have mild illness and can rest at home until they get better. This usually takes about 2 weeks, but it's not the same for everyone. If you are recovering from COVID-19, it's important to stay home and \"self-isolate\" while you are most likely to spread the virus. Self-isolation means staying apart from other people, even the people you live with. When you can stop self-isolation will depend on how long it has been since you had symptoms. If you are generally healthy and your symptoms are improving (or you don't have symptoms), experts recommend self-isolating for at least 5 days. The 5 days starts the day after you develop symptoms or get tested. After this, you should wear a mask around all other people for 5 more days. If you are not sure how long to self-isolate, or if you still have symptoms after 5 days, talk to your doctor or nurse. You should also check with your doctor or nurse if you have a weakened immune system. If you are at risk for getting seriously ill, doctors might recommend treatment even if you only have mild symptoms. This can lower your risk of getting sicker. Options might include pills that you take for a few days, a treatment called \"monoclonal antibodies\" that is given through an IV or as a shot, and another medicine that is given by IV. Doctors also might recommend being part of a clinical trial. This is a scientific study that tests new medicines to see how well they work.  Do not try any new medicines or treatments without talking to a doctor. ? Severe illness - If you have more severe illness with trouble breathing, you might need to stay in the hospital, possibly in the intensive care unit (also called the \"ICU\"). While you are there, you will most likely be in a special isolation room. Only medical staff will be allowed in the room, and they will have to wear special gowns, gloves, masks, and eye protection. The doctors and nurses can monitor and support your breathing and other body functions and make you as comfortable as possible. You might need extra oxygen to help you breathe easily. If you are having a very hard time breathing, you might need a breathing tube. The tube goes down your throat and into your lungs. It is connected to a machine to help you breathe, called a \"ventilator. \" You might also get medicines that have been shown to help some people with severe COVID-19. COVID Recommendations  Medications such as mucinex, flonase, and claritin may help with symptoms. Use tylenol/motrin as needed for body aches/fevers  To boost your immune system, it is recommended to take Vitamin B, C, D, and zinc.  Zinc  mg daily for 5 days and then decrease to 50 mg a day. Sometimes higher doses may cause nausea. Vitamin C 1000 mg a day  Vitamin D 20 - 25 mcg a day  Take Aspirin 81 mg unless you have an allergy to aspirin or you have stomach issues/bleeding disorders. Delsym is a great OTC cough suppressant. Drink plenty of water to stay hydrated. Get adequate rest. Try lying on your stomach intermittently to help you breathe easier. Get up and move around when you can. Perform deep breathing exercises  Check your oxygen saturation regularly and if below 90%, go to the ER. Go to the ER if you have severe SOA, chest pain, difficulty breathing, or you can not keep your fever below 102F with medications.          Electronically signed by HAN Garcia CNP on 10/31/2022 at 2:31 PM

## 2022-11-06 ENCOUNTER — APPOINTMENT (OUTPATIENT)
Dept: GENERAL RADIOLOGY | Age: 52
End: 2022-11-06
Payer: MEDICAID

## 2022-11-06 ENCOUNTER — HOSPITAL ENCOUNTER (EMERGENCY)
Age: 52
Discharge: HOME | End: 2022-11-07
Attending: EMERGENCY MEDICINE
Payer: MEDICAID

## 2022-11-06 VITALS
SYSTOLIC BLOOD PRESSURE: 109 MMHG | BODY MASS INDEX: 24.29 KG/M2 | HEIGHT: 69 IN | OXYGEN SATURATION: 98 % | HEART RATE: 68 BPM | WEIGHT: 164 LBS | DIASTOLIC BLOOD PRESSURE: 65 MMHG | RESPIRATION RATE: 16 BRPM | TEMPERATURE: 98.3 F

## 2022-11-06 DIAGNOSIS — U07.1 COVID-19 VIRUS INFECTION: Primary | ICD-10-CM

## 2022-11-06 DIAGNOSIS — R51.9 ACUTE NONINTRACTABLE HEADACHE, UNSPECIFIED HEADACHE TYPE: ICD-10-CM

## 2022-11-06 LAB
ALBUMIN SERPL-MCNC: 4.4 G/DL (ref 3.5–5.2)
ALP BLD-CCNC: 74 U/L (ref 40–130)
ALT SERPL-CCNC: 22 U/L (ref 5–41)
ANION GAP SERPL CALCULATED.3IONS-SCNC: 13 MMOL/L (ref 7–19)
AST SERPL-CCNC: 24 U/L (ref 5–40)
BASOPHILS ABSOLUTE: 0 K/UL (ref 0–0.2)
BASOPHILS RELATIVE PERCENT: 0.3 % (ref 0–1)
BILIRUB SERPL-MCNC: 1.5 MG/DL (ref 0.2–1.2)
BUN BLDV-MCNC: 11 MG/DL (ref 6–20)
CALCIUM SERPL-MCNC: 9.3 MG/DL (ref 8.6–10)
CHLORIDE BLD-SCNC: 101 MMOL/L (ref 98–111)
CO2: 22 MMOL/L (ref 22–29)
CREAT SERPL-MCNC: 0.7 MG/DL (ref 0.5–1.2)
EOSINOPHILS ABSOLUTE: 0.1 K/UL (ref 0–0.6)
EOSINOPHILS RELATIVE PERCENT: 1 % (ref 0–5)
GFR SERPL CREATININE-BSD FRML MDRD: >60 ML/MIN/{1.73_M2}
GLUCOSE BLD-MCNC: 94 MG/DL (ref 74–109)
HCT VFR BLD CALC: 49.4 % (ref 42–52)
HEMOGLOBIN: 17.9 G/DL (ref 14–18)
IMMATURE GRANULOCYTES #: 0 K/UL
LYMPHOCYTES ABSOLUTE: 2.3 K/UL (ref 1.1–4.5)
LYMPHOCYTES RELATIVE PERCENT: 24 % (ref 20–40)
MCH RBC QN AUTO: 32.5 PG (ref 27–31)
MCHC RBC AUTO-ENTMCNC: 36.2 G/DL (ref 33–37)
MCV RBC AUTO: 89.8 FL (ref 80–94)
MONOCYTES ABSOLUTE: 1 K/UL (ref 0–0.9)
MONOCYTES RELATIVE PERCENT: 10.9 % (ref 0–10)
NEUTROPHILS ABSOLUTE: 6 K/UL (ref 1.5–7.5)
NEUTROPHILS RELATIVE PERCENT: 63.4 % (ref 50–65)
PDW BLD-RTO: 12.6 % (ref 11.5–14.5)
PLATELET # BLD: 180 K/UL (ref 130–400)
PMV BLD AUTO: 10.3 FL (ref 9.4–12.4)
POTASSIUM SERPL-SCNC: 3.7 MMOL/L (ref 3.5–5)
RBC # BLD: 5.5 M/UL (ref 4.7–6.1)
SODIUM BLD-SCNC: 136 MMOL/L (ref 136–145)
TOTAL PROTEIN: 7.4 G/DL (ref 6.6–8.7)
WBC # BLD: 9.5 K/UL (ref 4.8–10.8)

## 2022-11-06 PROCEDURE — 6360000002 HC RX W HCPCS: Performed by: EMERGENCY MEDICINE

## 2022-11-06 PROCEDURE — 36415 COLL VENOUS BLD VENIPUNCTURE: CPT

## 2022-11-06 PROCEDURE — 2580000003 HC RX 258: Performed by: EMERGENCY MEDICINE

## 2022-11-06 PROCEDURE — 80053 COMPREHEN METABOLIC PANEL: CPT

## 2022-11-06 PROCEDURE — 96375 TX/PRO/DX INJ NEW DRUG ADDON: CPT

## 2022-11-06 PROCEDURE — 85025 COMPLETE CBC W/AUTO DIFF WBC: CPT

## 2022-11-06 PROCEDURE — 71045 X-RAY EXAM CHEST 1 VIEW: CPT

## 2022-11-06 PROCEDURE — 96374 THER/PROPH/DIAG INJ IV PUSH: CPT

## 2022-11-06 PROCEDURE — 99284 EMERGENCY DEPT VISIT MOD MDM: CPT

## 2022-11-06 RX ORDER — KETOROLAC TROMETHAMINE 30 MG/ML
30 INJECTION, SOLUTION INTRAMUSCULAR; INTRAVENOUS ONCE
Status: COMPLETED | OUTPATIENT
Start: 2022-11-06 | End: 2022-11-06

## 2022-11-06 RX ORDER — SODIUM CHLORIDE, SODIUM LACTATE, POTASSIUM CHLORIDE, AND CALCIUM CHLORIDE .6; .31; .03; .02 G/100ML; G/100ML; G/100ML; G/100ML
1000 INJECTION, SOLUTION INTRAVENOUS ONCE
Status: COMPLETED | OUTPATIENT
Start: 2022-11-06 | End: 2022-11-06

## 2022-11-06 RX ORDER — MORPHINE SULFATE 4 MG/ML
4 INJECTION, SOLUTION INTRAMUSCULAR; INTRAVENOUS ONCE
Status: COMPLETED | OUTPATIENT
Start: 2022-11-06 | End: 2022-11-06

## 2022-11-06 RX ADMIN — SODIUM CHLORIDE, SODIUM LACTATE, POTASSIUM CHLORIDE, AND CALCIUM CHLORIDE 1000 ML: 600; 310; 30; 20 INJECTION, SOLUTION INTRAVENOUS at 22:22

## 2022-11-06 RX ADMIN — KETOROLAC TROMETHAMINE 30 MG: 30 INJECTION, SOLUTION INTRAMUSCULAR; INTRAVENOUS at 22:22

## 2022-11-06 RX ADMIN — MORPHINE SULFATE 4 MG: 4 INJECTION, SOLUTION INTRAMUSCULAR; INTRAVENOUS at 23:45

## 2022-11-06 ASSESSMENT — PAIN DESCRIPTION - LOCATION: LOCATION: HEAD

## 2022-11-06 ASSESSMENT — PAIN SCALES - GENERAL: PAINLEVEL_OUTOF10: 9

## 2022-11-06 ASSESSMENT — PAIN - FUNCTIONAL ASSESSMENT: PAIN_FUNCTIONAL_ASSESSMENT: 0-10

## 2022-11-06 ASSESSMENT — PAIN DESCRIPTION - DESCRIPTORS: DESCRIPTORS: ACHING

## 2022-11-06 NOTE — Clinical Note
Susan Hashimoto was seen and treated in our emergency department on 11/6/2022. He may return to work on 11/10/2022. Patient was seen in the ED on 11/6/22 and was previously diagnosed with Covid infection with symptoms onset 10/30/22/  Based upon 10 days of routine he would be cleared to return to work on 11/10/22. If you have any questions or concerns, please don't hesitate to call.       Johan Luciano MD

## 2022-11-29 NOTE — ED PROVIDER NOTES
[facility] EMERGENCY DEPT  eMERGENCY dEPARTMENT eNCOUnter      Pt Name: Quita Macdonald  MRN: [id number]  [place] 1970  Date of evaluation: 11/6/2022  Provider: Sara Shelley MD    CHIEF COMPLAINT       Chief Complaint   Patient presents with    Positive For Covid-19     Patient diagnosed with Covid 19 on Monday. He has c/o of severe headache with vomiting, and dizziness           HISTORY OF PRESENT ILLNESS   (Location/Symptom, Timing/Onset,Context/Setting, Quality, Duration, Modifying Factors, Severity)  Note limiting factors. Quita Macdonald is a 47 y.o. male who presents to the emergency department for evaluation regarding headache, cough. Patient states he has had ongoing issues with nausea and vomiting and feeling very dizzy. He describes diffuse body aches. The symptoms have been ongoing for about the past 5 days. States that he was diagnosed with COVID-19 infection on the 31st.  He has continued to hold down oral intake although states that he has loss of appetite. He has had some mild increased shortness of breath with any kind of exertion. Denies palpitations or syncopal events. Does not have any prior history of chronic lung disease or asthma. He is not maintained on home oxygen therapy. States he was also diagnosed with an ear infection and started on outpatient course of Omnicef. HPI    NursingNotes were reviewed. REVIEW OF SYSTEMS    (2-9 systems for level 4, 10 or more for level 5)     Review of Systems   Constitutional:  Positive for appetite change, fatigue and fever. HENT:  Positive for congestion. Respiratory:  Positive for cough and shortness of breath. Cardiovascular:  Negative for chest pain. Gastrointestinal:  Positive for nausea and vomiting. Neurological:  Negative for dizziness and syncope. All other systems reviewed and are negative.          PAST MEDICALHISTORY     Past Medical History:   Diagnosis Date    Inguinal hernia bilateral, non-recurrent     Non-recurrent bilateral inguinal hernia without obstruction or gangrene 6/10/2020         SURGICAL HISTORY       Past Surgical History:   Procedure Laterality Date    DENTAL SURGERY      all upper teeth removed    HERNIA REPAIR Bilateral 6/10/2020    LAPAROSCOPIC BILATERAL INGUINAL HERNIA REPAIR WITH MESH performed by Liv Sexton MD at [address] [facility]     Discharge Medication List as of 11/6/2022 11:54 PM        CONTINUE these medications which have NOT CHANGED    Details   cefdinir (OMNICEF) 300 MG capsule Take 1 capsule by mouth 2 times daily for 10 days, Disp-20 capsule, R-0Normal      ibuprofen (ADVIL;MOTRIN) 200 MG tablet Take 200 mg by mouth every 6 hours as needed for PainHistorical Med             ALLERGIES     Patient has no known allergies. FAMILY HISTORY       Family History   Adopted: Yes   Problem Relation Age of Onset    Diabetes Brother           SOCIAL HISTORY       Social History     Socioeconomic History    Marital status:      Spouse name: None    Number of children: None    Years of education: None    Highest education level: None   Tobacco Use    Smoking status: Every Day     Packs/day: 1.00     Years: 32.00     Pack years: 32.00     Types: Cigarettes     Start date: 1989    Smokeless tobacco: Never    Tobacco comments:     Work at [employer] Use: Never used   Substance and Sexual Activity    Alcohol use:  Yes     Alcohol/week: 6.0 standard drinks     Types: 6 Cans of beer per week     Comment: 6 pack per night    Drug use: No       SCREENINGS    Tracee Coma Scale  Eye Opening: Spontaneous  Best Verbal Response: Oriented  Best Motor Response: Obeys commands  Tracee Coma Scale Score: 15        PHYSICAL EXAM    (up to 7 for level 4, 8 or more for level 5)     ED Triage Vitals [11/06/22 2201]   BP Temp Temp Source Heart Rate Resp SpO2 Height Weight   109/65 98.3 °F (36.8 °C) Oral 68 16 98 % 5' 9\" (1.753 m) 164 lb (74.4 kg)       Physical Exam  Vitals and nursing note reviewed. HENT:      Head: Atraumatic. Mouth/Throat:      Mouth: Mucous membranes are moist. Mucous membranes are not dry. Eyes:      General: No scleral icterus. Pupils: Pupils are equal, round, and reactive to light. Neck:      Trachea: No tracheal deviation. Cardiovascular:      Rate and Rhythm: Normal rate and regular rhythm. Pulses: Normal pulses. Heart sounds: Normal heart sounds. No murmur heard. Pulmonary:      Effort: Pulmonary effort is normal. No respiratory distress. Breath sounds: Normal breath sounds. No stridor. Abdominal:      General: There is no distension. Palpations: Abdomen is soft. Tenderness: There is no abdominal tenderness. There is no guarding. Skin:     Capillary Refill: Capillary refill takes less than 2 seconds. Coloration: Skin is not pale. Findings: No rash. Neurological:      Mental Status: He is alert and oriented to person, place, and time. Psychiatric:         Behavior: Behavior is cooperative. DIAGNOSTIC RESULTS       RADIOLOGY:  Non-plain film images such as CT, Ultrasound and MRI are read by the radiologist. Plain radiographic images are visualized and preliminarily interpreted bythe emergency physician with the below findings:      XR CHEST PORTABLE   Final Result   There is a suggestion of mild infiltrates in the right lung base. Electronically signed by Obie Eubanks MD on 11-06-22 at 2321              LABS:  Labs Reviewed   CBC WITH AUTO DIFFERENTIAL - Abnormal; Notable for the following components:       Result Value    MCH 32.5 (*)     Monocytes % 10.9 (*)     Monocytes Absolute 1.00 (*)     All other components within normal limits   COMPREHENSIVE METABOLIC PANEL - Abnormal; Notable for the following components: Total Bilirubin 1.5 (*)     All other components within normal limits       All other labs were within normal range or not returned as of this dictation.     EMERGENCY

## 2022-11-30 ASSESSMENT — ENCOUNTER SYMPTOMS
SHORTNESS OF BREATH: 1
NAUSEA: 1
COUGH: 1
VOMITING: 1

## 2024-04-15 ENCOUNTER — OFFICE VISIT (OUTPATIENT)
Dept: FAMILY MEDICINE CLINIC | Age: 54
End: 2024-04-15
Payer: MEDICAID

## 2024-04-15 VITALS
OXYGEN SATURATION: 98 % | BODY MASS INDEX: 24.73 KG/M2 | TEMPERATURE: 98.4 F | WEIGHT: 167.44 LBS | SYSTOLIC BLOOD PRESSURE: 172 MMHG | HEART RATE: 76 BPM | DIASTOLIC BLOOD PRESSURE: 116 MMHG

## 2024-04-15 DIAGNOSIS — I10 PRIMARY HYPERTENSION: Primary | ICD-10-CM

## 2024-04-15 PROCEDURE — 99214 OFFICE O/P EST MOD 30 MIN: CPT | Performed by: NURSE PRACTITIONER

## 2024-04-15 PROCEDURE — 3080F DIAST BP >= 90 MM HG: CPT | Performed by: NURSE PRACTITIONER

## 2024-04-15 PROCEDURE — 3077F SYST BP >= 140 MM HG: CPT | Performed by: NURSE PRACTITIONER

## 2024-04-15 RX ORDER — CLONIDINE HYDROCHLORIDE 0.1 MG/1
0.1 TABLET ORAL EVERY 6 HOURS PRN
Qty: 60 TABLET | Refills: 3 | Status: SHIPPED | OUTPATIENT
Start: 2024-04-15

## 2024-04-15 RX ORDER — LISINOPRIL 20 MG/1
20 TABLET ORAL DAILY
Qty: 30 TABLET | Refills: 5 | Status: SHIPPED | OUTPATIENT
Start: 2024-04-15

## 2024-04-15 SDOH — ECONOMIC STABILITY: HOUSING INSECURITY
IN THE LAST 12 MONTHS, WAS THERE A TIME WHEN YOU DID NOT HAVE A STEADY PLACE TO SLEEP OR SLEPT IN A SHELTER (INCLUDING NOW)?: YES

## 2024-04-15 SDOH — ECONOMIC STABILITY: FOOD INSECURITY: WITHIN THE PAST 12 MONTHS, THE FOOD YOU BOUGHT JUST DIDN'T LAST AND YOU DIDN'T HAVE MONEY TO GET MORE.: NEVER TRUE

## 2024-04-15 SDOH — ECONOMIC STABILITY: INCOME INSECURITY: HOW HARD IS IT FOR YOU TO PAY FOR THE VERY BASICS LIKE FOOD, HOUSING, MEDICAL CARE, AND HEATING?: NOT HARD AT ALL

## 2024-04-15 SDOH — ECONOMIC STABILITY: FOOD INSECURITY: WITHIN THE PAST 12 MONTHS, YOU WORRIED THAT YOUR FOOD WOULD RUN OUT BEFORE YOU GOT MONEY TO BUY MORE.: NEVER TRUE

## 2024-04-15 ASSESSMENT — ENCOUNTER SYMPTOMS
SORE THROAT: 0
WHEEZING: 0
BACK PAIN: 0
DIARRHEA: 0
COUGH: 0
ABDOMINAL PAIN: 0
NAUSEA: 0
VOMITING: 0

## 2024-04-15 ASSESSMENT — PATIENT HEALTH QUESTIONNAIRE - PHQ9
SUM OF ALL RESPONSES TO PHQ QUESTIONS 1-9: 3
7. TROUBLE CONCENTRATING ON THINGS, SUCH AS READING THE NEWSPAPER OR WATCHING TELEVISION: NOT AT ALL
2. FEELING DOWN, DEPRESSED OR HOPELESS: NOT AT ALL
9. THOUGHTS THAT YOU WOULD BE BETTER OFF DEAD, OR OF HURTING YOURSELF: NOT AT ALL
6. FEELING BAD ABOUT YOURSELF - OR THAT YOU ARE A FAILURE OR HAVE LET YOURSELF OR YOUR FAMILY DOWN: NOT AT ALL
SUM OF ALL RESPONSES TO PHQ QUESTIONS 1-9: 3
8. MOVING OR SPEAKING SO SLOWLY THAT OTHER PEOPLE COULD HAVE NOTICED. OR THE OPPOSITE, BEING SO FIGETY OR RESTLESS THAT YOU HAVE BEEN MOVING AROUND A LOT MORE THAN USUAL: NOT AT ALL
SUM OF ALL RESPONSES TO PHQ QUESTIONS 1-9: 3
SUM OF ALL RESPONSES TO PHQ QUESTIONS 1-9: 3
3. TROUBLE FALLING OR STAYING ASLEEP: NOT AT ALL
5. POOR APPETITE OR OVEREATING: NOT AT ALL
10. IF YOU CHECKED OFF ANY PROBLEMS, HOW DIFFICULT HAVE THESE PROBLEMS MADE IT FOR YOU TO DO YOUR WORK, TAKE CARE OF THINGS AT HOME, OR GET ALONG WITH OTHER PEOPLE: NOT DIFFICULT AT ALL
1. LITTLE INTEREST OR PLEASURE IN DOING THINGS: NEARLY EVERY DAY
4. FEELING TIRED OR HAVING LITTLE ENERGY: NOT AT ALL
SUM OF ALL RESPONSES TO PHQ9 QUESTIONS 1 & 2: 3

## 2024-04-15 NOTE — PATIENT INSTRUCTIONS
[facility]   Financial Resources*    Crisis Resources    [facility]  [address] [place], [place] [address]  [phone]  [facility]   [address], [place] [address]  Website: [email or web address]/  [phone]  [facility]   [address] [place], [place] [address]  Website: [email or web address]://www.[place]/  [phone]  [facility]  [address], [place] [address]  Website: [email or web address]/  [phone]  [facility]  [address], [place] [address]  [phone]  [facility]  [address], [place] [address]  [phone]  [facility]  [address], [place] [address]  [phone]    [facility] Support [facility]  [address] [address]  [phone] or [phone]  [facility]  [address], [place] [address]  Website: [email or web address]://[email or web address]/  341.544.6417  [facility]  [address] [place], [place] [address]  [phone]    [facility]  [address] [place], [place] [address]  Website: [email or web address]://[email or web address]-[email or web address]/  [phone]  [facility] Family Support Office  [address] [address]  [phone]    [facility]  [address], [place] [address]  Website: [email or web address]://[email or web address]-[email or web address]/  [phone]  [facility] Support [facility]  [address] [place] [address]  [phone]    [facility]  [address], [place] [address]  Website: [email or web address]  [phone]  [facility] Support [facility]  [address], [place] [address]  [phone]     [facility] Family Support Office  [address] [place] [address] [phone]  Cassie

## 2024-04-15 NOTE — PROGRESS NOTES
Wiley White (:  1970) is a 53 y.o. male,Established patient, here for evaluation of the following chief complaint(s):  Blood Pressure Check (Complains that his bp has been running justus having headaches, Friday 190/104 , so dizzy that he fell, has taken 2 types of meds for bp )      ASSESSMENT/PLAN:    ICD-10-CM    1. Primary hypertension  I10 cloNIDine (CATAPRES) 0.1 MG tablet  Use as needed  With log       lisinopril (PRINIVIL;ZESTRIL) 20 MG tablet  Start today 2 weeks for effect  Discussed importance            Return in about 2 weeks (around 2024) for 2 week htn follow up.    SUBJECTIVE/OBJECTIVE:  HPI      Patient is here for issues   Reports the last month or so been worse  He is a  by trade and been worse  He reports that with tight money he went to give plasma    First time denied due to blood pressure  So he started checking it 160s-180s/90s-110s  Reports he tried again after not smoking  And able to donate  But this Friday went to donate plasma  Avoided caffeine and nicotine to donate plasma  But still elevated      BP (!) 172/116   Pulse 76   Temp 98.4 °F (36.9 °C) (Temporal)   Wt 75.9 kg (167 lb 7 oz)   SpO2 98%   BMI 24.73 kg/m²   Review of Systems   Constitutional:  Negative for activity change, appetite change, fatigue and fever.   HENT:  Negative for congestion, postnasal drip and sore throat.    Respiratory:  Negative for cough and wheezing.    Cardiovascular:  Negative for chest pain, palpitations and leg swelling.   Gastrointestinal:  Negative for abdominal pain, diarrhea, nausea and vomiting.   Musculoskeletal:  Negative for arthralgias and back pain.   Skin:  Negative for rash.   Neurological:  Positive for headaches.   Psychiatric/Behavioral:  Negative for behavioral problems, self-injury and sleep disturbance. The patient is not nervous/anxious.        Physical Exam  Vitals reviewed.   Constitutional:       General: He is not in acute distress.     Appearance:

## 2024-04-29 ENCOUNTER — OFFICE VISIT (OUTPATIENT)
Dept: FAMILY MEDICINE CLINIC | Age: 54
End: 2024-04-29
Payer: MEDICAID

## 2024-04-29 VITALS
WEIGHT: 186 LBS | SYSTOLIC BLOOD PRESSURE: 140 MMHG | OXYGEN SATURATION: 98 % | BODY MASS INDEX: 27.47 KG/M2 | DIASTOLIC BLOOD PRESSURE: 88 MMHG | HEART RATE: 65 BPM | TEMPERATURE: 97.8 F

## 2024-04-29 DIAGNOSIS — I10 PRIMARY HYPERTENSION: ICD-10-CM

## 2024-04-29 PROCEDURE — 3077F SYST BP >= 140 MM HG: CPT | Performed by: NURSE PRACTITIONER

## 2024-04-29 PROCEDURE — 3079F DIAST BP 80-89 MM HG: CPT | Performed by: NURSE PRACTITIONER

## 2024-04-29 PROCEDURE — 99213 OFFICE O/P EST LOW 20 MIN: CPT | Performed by: NURSE PRACTITIONER

## 2024-04-29 RX ORDER — LISINOPRIL 40 MG/1
40 TABLET ORAL DAILY
Qty: 30 TABLET | Refills: 11 | Status: SHIPPED | OUTPATIENT
Start: 2024-04-29

## 2024-04-29 ASSESSMENT — ENCOUNTER SYMPTOMS
NAUSEA: 0
COUGH: 0
DIARRHEA: 0
ABDOMINAL PAIN: 0
SORE THROAT: 0
WHEEZING: 0
VOMITING: 0
BACK PAIN: 0

## 2024-04-29 NOTE — PROGRESS NOTES
Wiley White (:  1970) is a 54 y.o. male,Established patient, here for evaluation of the following chief complaint(s):  Follow-up (For HTN)      ASSESSMENT/PLAN:    ICD-10-CM    1. Primary hypertension  I10 lisinopril (PRINIVIL;ZESTRIL) 40 MG tablet  Goal unders 130s/80s  Clonidine as needed            No follow-ups on file.    SUBJECTIVE/OBJECTIVE:  HPI  Patient is here for 2 week follow up     HTN:  Reports has started with no issues  Reports he feels calmer and feeling better      Medication:  Lisinopril 20mg daily   With clonidine as needed  Self monitoring readings :140s/80s  Last labs : none  Adherent to low sodium diet: none  Barriers to success: none  Lab Results   Component Value Date    CREATININE 0.7 2022       Tobacco history: He  reports that he has been smoking cigarettes. He started smoking about 35 years ago. He has a 35.3 pack-year smoking history. He has never used smokeless tobacco.    BP (!) 140/88   Pulse 65   Temp 97.8 °F (36.6 °C) (Temporal)   Wt 84.4 kg (186 lb)   SpO2 98%   BMI 27.47 kg/m²   Review of Systems   Constitutional:  Negative for activity change, appetite change, fatigue and fever.   HENT:  Negative for congestion, postnasal drip and sore throat.    Respiratory:  Negative for cough and wheezing.    Cardiovascular:  Negative for chest pain, palpitations and leg swelling.   Gastrointestinal:  Negative for abdominal pain, diarrhea, nausea and vomiting.   Musculoskeletal:  Negative for arthralgias and back pain.   Skin:  Negative for rash.   Neurological:  Negative for headaches (improved).   Psychiatric/Behavioral:  Negative for behavioral problems, self-injury and sleep disturbance. The patient is not nervous/anxious.        Physical Exam  Vitals reviewed.   Constitutional:       General: He is not in acute distress.     Appearance: Normal appearance. He is not ill-appearing.   HENT:      Head: Normocephalic.      Right Ear: There is no impacted cerumen.

## 2024-06-17 ENCOUNTER — OFFICE VISIT (OUTPATIENT)
Dept: FAMILY MEDICINE CLINIC | Age: 54
End: 2024-06-17
Payer: MEDICAID

## 2024-06-17 VITALS
OXYGEN SATURATION: 98 % | HEART RATE: 78 BPM | TEMPERATURE: 98 F | DIASTOLIC BLOOD PRESSURE: 82 MMHG | BODY MASS INDEX: 23.89 KG/M2 | WEIGHT: 161.75 LBS | SYSTOLIC BLOOD PRESSURE: 130 MMHG

## 2024-06-17 DIAGNOSIS — R00.2 PALPITATIONS: ICD-10-CM

## 2024-06-17 DIAGNOSIS — I10 PRIMARY HYPERTENSION: Primary | ICD-10-CM

## 2024-06-17 PROCEDURE — 3075F SYST BP GE 130 - 139MM HG: CPT | Performed by: NURSE PRACTITIONER

## 2024-06-17 PROCEDURE — 3079F DIAST BP 80-89 MM HG: CPT | Performed by: NURSE PRACTITIONER

## 2024-06-17 PROCEDURE — 99214 OFFICE O/P EST MOD 30 MIN: CPT | Performed by: NURSE PRACTITIONER

## 2024-06-17 RX ORDER — LISINOPRIL 40 MG/1
40 TABLET ORAL DAILY
Qty: 30 TABLET | Refills: 11 | Status: SHIPPED | OUTPATIENT
Start: 2024-06-17

## 2024-06-17 ASSESSMENT — ENCOUNTER SYMPTOMS
ABDOMINAL PAIN: 0
BACK PAIN: 0
WHEEZING: 0
SORE THROAT: 0
NAUSEA: 0
DIARRHEA: 0
COUGH: 0
VOMITING: 0

## 2024-06-17 NOTE — PROGRESS NOTES
Wiley White (:  1970) is a 54 y.o. male,Established patient, here for evaluation of the following chief complaint(s):  1 Month Follow-Up (When he wakes up he feels like his hear is racing, feels like it calms down a bit after taking medication )      ASSESSMENT/PLAN:    ICD-10-CM    1. Primary hypertension  I10 lisinopril (PRINIVIL;ZESTRIL) 40 MG tablet  Cont present  Will check at work        2. Palpitations  R00.2 Cardiac holter monitor (1 day-2 day)  Will monitor   And follow up            No follow-ups on file.    SUBJECTIVE/OBJECTIVE:  HPI  Patient is here for 1 month follow up    HTN:  Reports has started with no issues  Reports he feels calmer and feeling better  He has done well at work and no long red faced        Medication:  Lisinopril 40mg daily   With clonidine as needed  Self monitoring readings : at goal 130s/70s or less  Last labs : none  Adherent to low sodium diet: none  Barriers to success: none        Lab Results   Component Value Date     CREATININE 0.7 2022         Tobacco history: He  reports that he has been smoking cigarettes. He started smoking about 35 years ago. He has a 35.3 pack-year smoking history. He has never used smokeless tobacco.        Patient reports at times notes getting heart palpatations  Reports will wake him up   And feels like it going \" not checked \"  It feels like pounding out of chest  Denies any heart burn       /82 (Site: Right Upper Arm, Position: Sitting, Cuff Size: Large Adult)   Pulse 78   Temp 98 °F (36.7 °C) (Temporal)   Wt 73.4 kg (161 lb 12 oz)   SpO2 98%   BMI 23.89 kg/m²   Review of Systems   Constitutional:  Negative for activity change, appetite change, fatigue and fever.   HENT:  Negative for congestion, postnasal drip and sore throat.    Respiratory:  Negative for cough and wheezing.    Cardiovascular:  Positive for palpitations. Negative for chest pain and leg swelling.   Gastrointestinal:  Negative for abdominal pain,

## 2024-07-19 ENCOUNTER — TELEPHONE (OUTPATIENT)
Dept: FAMILY MEDICINE CLINIC | Age: 54
End: 2024-07-19

## 2024-07-19 DIAGNOSIS — R00.2 PALPITATIONS: ICD-10-CM

## 2024-07-19 NOTE — TELEPHONE ENCOUNTER
----- Message from HAN Meyer sent at 7/19/2024 11:30 AM CDT -----  Holter noted  Normal rhythm during day   No pacs or pvcs  Did not bradycardia at assumed time of sleeping ( which is normal )

## 2025-07-15 ENCOUNTER — OFFICE VISIT (OUTPATIENT)
Age: 55
End: 2025-07-15

## 2025-07-15 VITALS
DIASTOLIC BLOOD PRESSURE: 68 MMHG | SYSTOLIC BLOOD PRESSURE: 120 MMHG | WEIGHT: 158 LBS | BODY MASS INDEX: 23.33 KG/M2 | OXYGEN SATURATION: 98 % | RESPIRATION RATE: 16 BRPM | TEMPERATURE: 98.8 F | HEART RATE: 77 BPM

## 2025-07-15 DIAGNOSIS — H61.22 IMPACTED CERUMEN OF LEFT EAR: Primary | ICD-10-CM

## 2025-07-15 ASSESSMENT — ENCOUNTER SYMPTOMS
SINUS PRESSURE: 0
COLOR CHANGE: 0
CONSTIPATION: 0
BLOOD IN STOOL: 0
RHINORRHEA: 0
SORE THROAT: 0
SHORTNESS OF BREATH: 0
ABDOMINAL PAIN: 0
WHEEZING: 0
EYE ITCHING: 0
COUGH: 0
EYE DISCHARGE: 0
NAUSEA: 0
VOMITING: 0
DIARRHEA: 0

## 2025-07-15 NOTE — PATIENT INSTRUCTIONS
-Use debrox daily to prevent ear wax build up  -Refrain from using Q-Tips in the ears  -Recommended OTC flonase  -Recommended OTC zyrtec or claritin  -The patient is to follow up with PCP or return to clinic if symptoms worsen/fail to improve.

## 2025-07-15 NOTE — PROGRESS NOTES
[facility], [facility] ([place])  [facility]  [address]  [address]  [place] [address]  Dept: [phone]    Wiley White is a 55 y.o. male who presents today for his medical conditions/complaints as noted below.  Wiley White is complaining of No chief complaint on file.        HPI:   Reports he felt like there was something in his left ear yesterday so he put peroxide in it and used a Q-Tip and states it now hurts and he is unable to hear out of it.      History provided by:  Patient  Ear Pain   There is pain in the left ear. This is a new problem. The current episode started yesterday. The problem occurs constantly. The problem has been waxing and waning. There has been no fever. The pain is mild. Associated symptoms include hearing loss (left). Pertinent negatives include no abdominal pain, coughing, diarrhea, headaches, rash, rhinorrhea, sore throat or vomiting.       Past Medical History:   Diagnosis Date    Inguinal hernia bilateral, non-recurrent     Non-recurrent bilateral inguinal hernia without obstruction or gangrene 6/10/2020       Past Surgical History:   Procedure Laterality Date    DENTAL SURGERY      all upper teeth removed    HERNIA REPAIR Bilateral 6/10/2020    LAPAROSCOPIC BILATERAL INGUINAL HERNIA REPAIR WITH MESH performed by Cristobal Shetty MD at [facility] OR       Family History   Adopted: Yes   Problem Relation Age of Onset    Diabetes Brother        Social History     Tobacco Use    Smoking status: Every Day     Current packs/day: 1.00     Average packs/day: 1 pack/day for 36.5 years (36.5 ttl pk-yrs)     Types: Cigarettes     Start date: 1/1/1989    Smokeless tobacco: Never    Tobacco comments:     Work at [employer]   Substance Use Topics    Alcohol use: Yes     Alcohol/week: 6.0 standard drinks of alcohol     Types: 6 Cans of beer per week     Comment: 6 pack per night        Current Outpatient Medications   Medication Sig Dispense Refill

## (undated) DEVICE — BLADE LARYNSCP HNDL MAC 3 DISP CURAVIEW LED

## (undated) DEVICE — SUTURE VCRL SZ 3-0 L27IN ABSRB UD L26MM SH 1/2 CIR J416H

## (undated) DEVICE — DECANTER VI VENT W/ VLV FOR ASEP TRNSF OF FLD

## (undated) DEVICE — DISSECTOR ENDOSCP PREPERI KID SHP DISTENSION BLLN SPCMKR

## (undated) DEVICE — GENERAL LAP CDS

## (undated) DEVICE — ADHESIVE SKIN CLSR 0.7ML TOP DERMBND ADV

## (undated) DEVICE — STERILE POLYISOPRENE POWDER-FREE SURGICAL GLOVES: Brand: PROTEXIS

## (undated) DEVICE — SUTURE MCRYL + SZ 4-0 L18IN ABSRB UD L19MM PS-2 3/8 CIR MCP496G

## (undated) DEVICE — TROCAR ENDOSCP L100MM DIA10MM STRUCTURAL BLLN FOR LAP

## (undated) DEVICE — SUTURE VCRL SZ 0 L27IN ABSRB VLT L36MM UR-5 5/8 CIR J376H

## (undated) DEVICE — STAPLER INT DIA5MM 25 ABSRB STRP FIX DISP FOR HERN MESH

## (undated) DEVICE — ATHLETIC SUPPORTER LATEX FREE, LARGE